# Patient Record
Sex: FEMALE | Race: WHITE | NOT HISPANIC OR LATINO | Employment: FULL TIME | URBAN - METROPOLITAN AREA
[De-identification: names, ages, dates, MRNs, and addresses within clinical notes are randomized per-mention and may not be internally consistent; named-entity substitution may affect disease eponyms.]

---

## 2021-09-02 PROCEDURE — U0004 COV-19 TEST NON-CDC HGH THRU: HCPCS | Performed by: NURSE PRACTITIONER

## 2022-02-23 ENCOUNTER — TRANSCRIBE ORDERS (OUTPATIENT)
Dept: ADMINISTRATIVE | Facility: HOSPITAL | Age: 27
End: 2022-02-23

## 2022-02-23 ENCOUNTER — LAB (OUTPATIENT)
Dept: LAB | Facility: HOSPITAL | Age: 27
End: 2022-02-23

## 2022-02-23 DIAGNOSIS — F41.9 ANXIETY: Primary | ICD-10-CM

## 2022-02-23 DIAGNOSIS — F41.9 ANXIETY: ICD-10-CM

## 2022-02-23 PROCEDURE — 36415 COLL VENOUS BLD VENIPUNCTURE: CPT

## 2022-02-23 PROCEDURE — 80050 GENERAL HEALTH PANEL: CPT

## 2022-02-24 LAB
ALBUMIN SERPL-MCNC: 4.5 G/DL (ref 3.5–5.2)
ALBUMIN/GLOB SERPL: 1.3 G/DL
ALP SERPL-CCNC: 80 U/L (ref 39–117)
ALT SERPL W P-5'-P-CCNC: 47 U/L (ref 1–33)
ANION GAP SERPL CALCULATED.3IONS-SCNC: 13.9 MMOL/L (ref 5–15)
AST SERPL-CCNC: 27 U/L (ref 1–32)
BASOPHILS # BLD AUTO: 0.04 10*3/MM3 (ref 0–0.2)
BASOPHILS NFR BLD AUTO: 0.4 % (ref 0–1.5)
BILIRUB SERPL-MCNC: 0.2 MG/DL (ref 0–1.2)
BUN SERPL-MCNC: 5 MG/DL (ref 6–20)
BUN/CREAT SERPL: 8.9 (ref 7–25)
CALCIUM SPEC-SCNC: 9.8 MG/DL (ref 8.6–10.5)
CHLORIDE SERPL-SCNC: 104 MMOL/L (ref 98–107)
CO2 SERPL-SCNC: 21.1 MMOL/L (ref 22–29)
CREAT SERPL-MCNC: 0.56 MG/DL (ref 0.57–1)
DEPRECATED RDW RBC AUTO: 43 FL (ref 37–54)
EOSINOPHIL # BLD AUTO: 0.02 10*3/MM3 (ref 0–0.4)
EOSINOPHIL NFR BLD AUTO: 0.2 % (ref 0.3–6.2)
ERYTHROCYTE [DISTWIDTH] IN BLOOD BY AUTOMATED COUNT: 15.7 % (ref 12.3–15.4)
GFR SERPL CREATININE-BSD FRML MDRD: 131 ML/MIN/1.73
GLOBULIN UR ELPH-MCNC: 3.6 GM/DL
GLUCOSE SERPL-MCNC: 87 MG/DL (ref 65–99)
HCT VFR BLD AUTO: 40.1 % (ref 34–46.6)
HGB BLD-MCNC: 12.8 G/DL (ref 12–15.9)
IMM GRANULOCYTES # BLD AUTO: 0.02 10*3/MM3 (ref 0–0.05)
IMM GRANULOCYTES NFR BLD AUTO: 0.2 % (ref 0–0.5)
LYMPHOCYTES # BLD AUTO: 1.54 10*3/MM3 (ref 0.7–3.1)
LYMPHOCYTES NFR BLD AUTO: 17.2 % (ref 19.6–45.3)
MCH RBC QN AUTO: 24.5 PG (ref 26.6–33)
MCHC RBC AUTO-ENTMCNC: 31.9 G/DL (ref 31.5–35.7)
MCV RBC AUTO: 76.8 FL (ref 79–97)
MONOCYTES # BLD AUTO: 0.59 10*3/MM3 (ref 0.1–0.9)
MONOCYTES NFR BLD AUTO: 6.6 % (ref 5–12)
NEUTROPHILS NFR BLD AUTO: 6.73 10*3/MM3 (ref 1.7–7)
NEUTROPHILS NFR BLD AUTO: 75.4 % (ref 42.7–76)
NRBC BLD AUTO-RTO: 0 /100 WBC (ref 0–0.2)
PLATELET # BLD AUTO: 276 10*3/MM3 (ref 140–450)
PMV BLD AUTO: 11.7 FL (ref 6–12)
POTASSIUM SERPL-SCNC: 4.5 MMOL/L (ref 3.5–5.2)
PROT SERPL-MCNC: 8.1 G/DL (ref 6–8.5)
RBC # BLD AUTO: 5.22 10*6/MM3 (ref 3.77–5.28)
SODIUM SERPL-SCNC: 139 MMOL/L (ref 136–145)
TSH SERPL DL<=0.05 MIU/L-ACNC: 2.74 UIU/ML (ref 0.27–4.2)
WBC NRBC COR # BLD: 8.94 10*3/MM3 (ref 3.4–10.8)

## 2024-03-26 ENCOUNTER — TRANSCRIBE ORDERS (OUTPATIENT)
Dept: LAB | Facility: HOSPITAL | Age: 29
End: 2024-03-26
Payer: COMMERCIAL

## 2024-03-26 ENCOUNTER — LAB (OUTPATIENT)
Dept: LAB | Facility: HOSPITAL | Age: 29
End: 2024-03-26
Payer: COMMERCIAL

## 2024-03-26 DIAGNOSIS — O16.1 HYPERTENSION AFFECTING PREGNANCY IN FIRST TRIMESTER: Primary | ICD-10-CM

## 2024-03-26 DIAGNOSIS — E13.9 DIABETES 1.5, MANAGED AS TYPE 2: ICD-10-CM

## 2024-03-26 DIAGNOSIS — O16.1 HYPERTENSION AFFECTING PREGNANCY IN FIRST TRIMESTER: ICD-10-CM

## 2024-03-26 LAB
ALBUMIN SERPL-MCNC: 4.1 G/DL (ref 3.5–5.2)
ALBUMIN/GLOB SERPL: 1.1 G/DL
ALP SERPL-CCNC: 131 U/L (ref 39–117)
ALT SERPL W P-5'-P-CCNC: 24 U/L (ref 1–33)
ANION GAP SERPL CALCULATED.3IONS-SCNC: 12.1 MMOL/L (ref 5–15)
AST SERPL-CCNC: 19 U/L (ref 1–32)
BASOPHILS # BLD AUTO: 0.04 10*3/MM3 (ref 0–0.2)
BASOPHILS NFR BLD AUTO: 0.6 % (ref 0–1.5)
BILIRUB SERPL-MCNC: 0.2 MG/DL (ref 0–1.2)
BUN SERPL-MCNC: 9 MG/DL (ref 6–20)
BUN/CREAT SERPL: 15.3 (ref 7–25)
CALCIUM SPEC-SCNC: 9.4 MG/DL (ref 8.6–10.5)
CHLORIDE SERPL-SCNC: 105 MMOL/L (ref 98–107)
CHOLEST SERPL-MCNC: 179 MG/DL (ref 0–200)
CO2 SERPL-SCNC: 22.9 MMOL/L (ref 22–29)
CREAT SERPL-MCNC: 0.59 MG/DL (ref 0.57–1)
DEPRECATED RDW RBC AUTO: 36.3 FL (ref 37–54)
EGFRCR SERPLBLD CKD-EPI 2021: 126.1 ML/MIN/1.73
EOSINOPHIL # BLD AUTO: 0.07 10*3/MM3 (ref 0–0.4)
EOSINOPHIL NFR BLD AUTO: 1 % (ref 0.3–6.2)
ERYTHROCYTE [DISTWIDTH] IN BLOOD BY AUTOMATED COUNT: 12.7 % (ref 12.3–15.4)
GLOBULIN UR ELPH-MCNC: 3.6 GM/DL
GLUCOSE SERPL-MCNC: 89 MG/DL (ref 65–99)
HBA1C MFR BLD: 5.1 % (ref 4.8–5.6)
HCT VFR BLD AUTO: 36.5 % (ref 34–46.6)
HDLC SERPL-MCNC: 39 MG/DL (ref 40–60)
HGB BLD-MCNC: 11.6 G/DL (ref 12–15.9)
IMM GRANULOCYTES # BLD AUTO: 0.02 10*3/MM3 (ref 0–0.05)
IMM GRANULOCYTES NFR BLD AUTO: 0.3 % (ref 0–0.5)
LDLC SERPL CALC-MCNC: 129 MG/DL (ref 0–100)
LDLC/HDLC SERPL: 3.29 {RATIO}
LYMPHOCYTES # BLD AUTO: 1.76 10*3/MM3 (ref 0.7–3.1)
LYMPHOCYTES NFR BLD AUTO: 24.3 % (ref 19.6–45.3)
MCH RBC QN AUTO: 25.5 PG (ref 26.6–33)
MCHC RBC AUTO-ENTMCNC: 31.8 G/DL (ref 31.5–35.7)
MCV RBC AUTO: 80.2 FL (ref 79–97)
MONOCYTES # BLD AUTO: 0.47 10*3/MM3 (ref 0.1–0.9)
MONOCYTES NFR BLD AUTO: 6.5 % (ref 5–12)
NEUTROPHILS NFR BLD AUTO: 4.89 10*3/MM3 (ref 1.7–7)
NEUTROPHILS NFR BLD AUTO: 67.3 % (ref 42.7–76)
NRBC BLD AUTO-RTO: 0 /100 WBC (ref 0–0.2)
PLATELET # BLD AUTO: 254 10*3/MM3 (ref 140–450)
PMV BLD AUTO: 11.1 FL (ref 6–12)
POTASSIUM SERPL-SCNC: 4.5 MMOL/L (ref 3.5–5.2)
PROT SERPL-MCNC: 7.7 G/DL (ref 6–8.5)
RBC # BLD AUTO: 4.55 10*6/MM3 (ref 3.77–5.28)
SODIUM SERPL-SCNC: 140 MMOL/L (ref 136–145)
TRIGL SERPL-MCNC: 59 MG/DL (ref 0–150)
TSH SERPL DL<=0.05 MIU/L-ACNC: 3.97 UIU/ML (ref 0.27–4.2)
VLDLC SERPL-MCNC: 11 MG/DL (ref 5–40)
WBC NRBC COR # BLD AUTO: 7.25 10*3/MM3 (ref 3.4–10.8)

## 2024-03-26 PROCEDURE — 80061 LIPID PANEL: CPT

## 2024-03-26 PROCEDURE — 80053 COMPREHEN METABOLIC PANEL: CPT

## 2024-03-26 PROCEDURE — 36415 COLL VENOUS BLD VENIPUNCTURE: CPT

## 2024-03-26 PROCEDURE — 85025 COMPLETE CBC W/AUTO DIFF WBC: CPT

## 2024-03-26 PROCEDURE — 83036 HEMOGLOBIN GLYCOSYLATED A1C: CPT

## 2024-03-26 PROCEDURE — 84443 ASSAY THYROID STIM HORMONE: CPT

## 2025-05-18 ENCOUNTER — APPOINTMENT (OUTPATIENT)
Dept: ULTRASOUND IMAGING | Facility: HOSPITAL | Age: 30
End: 2025-05-18
Payer: COMMERCIAL

## 2025-05-18 ENCOUNTER — HOSPITAL ENCOUNTER (OUTPATIENT)
Facility: HOSPITAL | Age: 30
Setting detail: OBSERVATION
Discharge: HOME | End: 2025-05-19
Attending: FAMILY MEDICINE | Admitting: EMERGENCY MEDICINE
Payer: COMMERCIAL

## 2025-05-18 DIAGNOSIS — D50.9 IRON DEFICIENCY ANEMIA, UNSPECIFIED IRON DEFICIENCY ANEMIA TYPE: ICD-10-CM

## 2025-05-18 DIAGNOSIS — R00.0 TACHYCARDIA: ICD-10-CM

## 2025-05-18 DIAGNOSIS — N93.9 ABNORMAL VAGINAL BLEEDING: Primary | ICD-10-CM

## 2025-05-18 LAB
ABO GROUP BLD: NORMAL
ALBUMIN SERPL-MCNC: 4.1 G/DL (ref 3.5–5.2)
ALBUMIN/GLOB SERPL: 1.2 G/DL
ALP SERPL-CCNC: 115 U/L (ref 39–117)
ALT SERPL W P-5'-P-CCNC: 13 U/L (ref 1–33)
ANION GAP SERPL CALCULATED.3IONS-SCNC: 11.9 MMOL/L (ref 5–15)
APTT PPP: 28 SECONDS (ref 22.7–35.4)
AST SERPL-CCNC: 20 U/L (ref 1–32)
BILIRUB SERPL-MCNC: 0.2 MG/DL (ref 0–1.2)
BLD GP AB SCN SERPL QL: NEGATIVE
BUN SERPL-MCNC: 10 MG/DL (ref 6–20)
BUN/CREAT SERPL: 13 (ref 7–25)
CALCIUM SPEC-SCNC: 9.5 MG/DL (ref 8.6–10.5)
CHLORIDE SERPL-SCNC: 105 MMOL/L (ref 98–107)
CO2 SERPL-SCNC: 22.1 MMOL/L (ref 22–29)
CREAT SERPL-MCNC: 0.77 MG/DL (ref 0.57–1)
CRP SERPL-MCNC: 3.97 MG/DL (ref 0–0.5)
D-LACTATE SERPL-SCNC: 1.3 MMOL/L (ref 0.5–2)
DEPRECATED RDW RBC AUTO: 43.1 FL (ref 37–54)
EGFRCR SERPLBLD CKD-EPI 2021: 107.2 ML/MIN/1.73
EOSINOPHIL # BLD MANUAL: 0.07 10*3/MM3 (ref 0–0.4)
EOSINOPHIL NFR BLD MANUAL: 1 % (ref 0.3–6.2)
ERYTHROCYTE [DISTWIDTH] IN BLOOD BY AUTOMATED COUNT: 17.7 % (ref 12.3–15.4)
FERRITIN SERPL-MCNC: 16.8 NG/ML (ref 13–150)
GLOBULIN UR ELPH-MCNC: 3.5 GM/DL
GLUCOSE SERPL-MCNC: 178 MG/DL (ref 65–99)
HBA1C MFR BLD: 5.53 % (ref 4.8–5.6)
HCG SERPL QL: NEGATIVE
HCT VFR BLD AUTO: 29.1 % (ref 34–46.6)
HCT VFR BLD AUTO: 30.7 % (ref 34–46.6)
HGB BLD-MCNC: 8 G/DL (ref 12–15.9)
HGB BLD-MCNC: 8.8 G/DL (ref 12–15.9)
INR PPP: 1.14 (ref 0.9–1.1)
IRON 24H UR-MRATE: 17 MCG/DL (ref 37–145)
IRON SATN MFR SERPL: 4 % (ref 20–50)
LARGE PLATELETS: NORMAL
LYMPHOCYTES # BLD MANUAL: 1.43 10*3/MM3 (ref 0.7–3.1)
LYMPHOCYTES NFR BLD MANUAL: 5 % (ref 5–12)
MCH RBC QN AUTO: 19 PG (ref 26.6–33)
MCHC RBC AUTO-ENTMCNC: 27.5 G/DL (ref 31.5–35.7)
MCV RBC AUTO: 69 FL (ref 79–97)
MICROCYTES BLD QL: NORMAL
MONOCYTES # BLD: 0.34 10*3/MM3 (ref 0.1–0.9)
NEUTROPHILS # BLD AUTO: 4.98 10*3/MM3 (ref 1.7–7)
NEUTROPHILS NFR BLD MANUAL: 73 % (ref 42.7–76)
NEUTS VAC BLD QL SMEAR: NORMAL
PLATELET # BLD AUTO: 340 10*3/MM3 (ref 140–450)
PMV BLD AUTO: 10.1 FL (ref 6–12)
POTASSIUM SERPL-SCNC: 4.1 MMOL/L (ref 3.5–5.2)
PROCALCITONIN SERPL-MCNC: 0.06 NG/ML (ref 0–0.25)
PROT SERPL-MCNC: 7.6 G/DL (ref 6–8.5)
PROTHROMBIN TIME: 14.6 SECONDS (ref 11.7–14.2)
RBC # BLD AUTO: 4.22 10*6/MM3 (ref 3.77–5.28)
RBC MORPH BLD: NORMAL
RH BLD: POSITIVE
SCAN SLIDE: NORMAL
SODIUM SERPL-SCNC: 139 MMOL/L (ref 136–145)
T&S EXPIRATION DATE: NORMAL
TIBC SERPL-MCNC: 447 MCG/DL (ref 298–536)
TOXIC GRANULATION: NORMAL
TRANSFERRIN SERPL-MCNC: 300 MG/DL (ref 200–360)
TSH SERPL DL<=0.05 MIU/L-ACNC: 2.48 UIU/ML (ref 0.27–4.2)
VARIANT LYMPHS NFR BLD MANUAL: 21 % (ref 19.6–45.3)
WBC NRBC COR # BLD AUTO: 6.82 10*3/MM3 (ref 3.4–10.8)

## 2025-05-18 PROCEDURE — 86901 BLOOD TYPING SEROLOGIC RH(D): CPT

## 2025-05-18 PROCEDURE — 36430 TRANSFUSION BLD/BLD COMPNT: CPT

## 2025-05-18 PROCEDURE — P9016 RBC LEUKOCYTES REDUCED: HCPCS

## 2025-05-18 PROCEDURE — 25810000003 SODIUM CHLORIDE 0.9 % SOLUTION

## 2025-05-18 PROCEDURE — 82728 ASSAY OF FERRITIN: CPT | Performed by: NURSE PRACTITIONER

## 2025-05-18 PROCEDURE — G0378 HOSPITAL OBSERVATION PER HR: HCPCS

## 2025-05-18 PROCEDURE — 76856 US EXAM PELVIC COMPLETE: CPT

## 2025-05-18 PROCEDURE — 84443 ASSAY THYROID STIM HORMONE: CPT

## 2025-05-18 PROCEDURE — 84703 CHORIONIC GONADOTROPIN ASSAY: CPT | Performed by: NURSE PRACTITIONER

## 2025-05-18 PROCEDURE — 96361 HYDRATE IV INFUSION ADD-ON: CPT

## 2025-05-18 PROCEDURE — 84145 PROCALCITONIN (PCT): CPT

## 2025-05-18 PROCEDURE — 83036 HEMOGLOBIN GLYCOSYLATED A1C: CPT

## 2025-05-18 PROCEDURE — 83540 ASSAY OF IRON: CPT | Performed by: NURSE PRACTITIONER

## 2025-05-18 PROCEDURE — 99285 EMERGENCY DEPT VISIT HI MDM: CPT

## 2025-05-18 PROCEDURE — 93976 VASCULAR STUDY: CPT

## 2025-05-18 PROCEDURE — 96365 THER/PROPH/DIAG IV INF INIT: CPT

## 2025-05-18 PROCEDURE — 84466 ASSAY OF TRANSFERRIN: CPT | Performed by: NURSE PRACTITIONER

## 2025-05-18 PROCEDURE — 25810000003 SODIUM CHLORIDE 0.9 % SOLUTION: Performed by: NURSE PRACTITIONER

## 2025-05-18 PROCEDURE — 80053 COMPREHEN METABOLIC PANEL: CPT | Performed by: NURSE PRACTITIONER

## 2025-05-18 PROCEDURE — 86900 BLOOD TYPING SEROLOGIC ABO: CPT

## 2025-05-18 PROCEDURE — 93005 ELECTROCARDIOGRAM TRACING: CPT | Performed by: FAMILY MEDICINE

## 2025-05-18 PROCEDURE — 86900 BLOOD TYPING SEROLOGIC ABO: CPT | Performed by: NURSE PRACTITIONER

## 2025-05-18 PROCEDURE — 85730 THROMBOPLASTIN TIME PARTIAL: CPT | Performed by: NURSE PRACTITIONER

## 2025-05-18 PROCEDURE — 86923 COMPATIBILITY TEST ELECTRIC: CPT

## 2025-05-18 PROCEDURE — 85014 HEMATOCRIT: CPT

## 2025-05-18 PROCEDURE — 85007 BL SMEAR W/DIFF WBC COUNT: CPT | Performed by: NURSE PRACTITIONER

## 2025-05-18 PROCEDURE — 85025 COMPLETE CBC W/AUTO DIFF WBC: CPT | Performed by: NURSE PRACTITIONER

## 2025-05-18 PROCEDURE — 85018 HEMOGLOBIN: CPT

## 2025-05-18 PROCEDURE — 93005 ELECTROCARDIOGRAM TRACING: CPT

## 2025-05-18 PROCEDURE — 25010000002 NA FERRIC GLUC CPLX PER 12.5 MG: Performed by: NURSE PRACTITIONER

## 2025-05-18 PROCEDURE — 86901 BLOOD TYPING SEROLOGIC RH(D): CPT | Performed by: NURSE PRACTITIONER

## 2025-05-18 PROCEDURE — 83605 ASSAY OF LACTIC ACID: CPT

## 2025-05-18 PROCEDURE — 84146 ASSAY OF PROLACTIN: CPT

## 2025-05-18 PROCEDURE — 85610 PROTHROMBIN TIME: CPT | Performed by: NURSE PRACTITIONER

## 2025-05-18 PROCEDURE — 86140 C-REACTIVE PROTEIN: CPT

## 2025-05-18 PROCEDURE — 86850 RBC ANTIBODY SCREEN: CPT | Performed by: NURSE PRACTITIONER

## 2025-05-18 PROCEDURE — 76830 TRANSVAGINAL US NON-OB: CPT

## 2025-05-18 RX ORDER — ONDANSETRON 4 MG/1
4 TABLET, ORALLY DISINTEGRATING ORAL EVERY 6 HOURS PRN
Status: DISCONTINUED | OUTPATIENT
Start: 2025-05-18 | End: 2025-05-19 | Stop reason: HOSPADM

## 2025-05-18 RX ORDER — AMOXICILLIN 250 MG
2 CAPSULE ORAL 2 TIMES DAILY PRN
Status: DISCONTINUED | OUTPATIENT
Start: 2025-05-18 | End: 2025-05-19 | Stop reason: HOSPADM

## 2025-05-18 RX ORDER — ACETAMINOPHEN 160 MG/5ML
650 SOLUTION ORAL EVERY 4 HOURS PRN
Status: DISCONTINUED | OUTPATIENT
Start: 2025-05-18 | End: 2025-05-19 | Stop reason: HOSPADM

## 2025-05-18 RX ORDER — BISACODYL 5 MG/1
5 TABLET, DELAYED RELEASE ORAL DAILY PRN
Status: DISCONTINUED | OUTPATIENT
Start: 2025-05-18 | End: 2025-05-19 | Stop reason: HOSPADM

## 2025-05-18 RX ORDER — TRANEXAMIC ACID 10 MG/ML
1000 INJECTION, SOLUTION INTRAVENOUS ONCE
Status: COMPLETED | OUTPATIENT
Start: 2025-05-18 | End: 2025-05-18

## 2025-05-18 RX ORDER — ACETAMINOPHEN 325 MG/1
650 TABLET ORAL EVERY 4 HOURS PRN
Status: DISCONTINUED | OUTPATIENT
Start: 2025-05-18 | End: 2025-05-19 | Stop reason: HOSPADM

## 2025-05-18 RX ORDER — HYDROCODONE BITARTRATE AND ACETAMINOPHEN 5; 325 MG/1; MG/1
1 TABLET ORAL EVERY 6 HOURS PRN
Refills: 0 | Status: DISCONTINUED | OUTPATIENT
Start: 2025-05-18 | End: 2025-05-19 | Stop reason: HOSPADM

## 2025-05-18 RX ORDER — SODIUM CHLORIDE 9 MG/ML
40 INJECTION, SOLUTION INTRAVENOUS AS NEEDED
Status: DISCONTINUED | OUTPATIENT
Start: 2025-05-18 | End: 2025-05-19 | Stop reason: HOSPADM

## 2025-05-18 RX ORDER — BISACODYL 10 MG
10 SUPPOSITORY, RECTAL RECTAL DAILY PRN
Status: DISCONTINUED | OUTPATIENT
Start: 2025-05-18 | End: 2025-05-19 | Stop reason: HOSPADM

## 2025-05-18 RX ORDER — POLYETHYLENE GLYCOL 3350 17 G/17G
17 POWDER, FOR SOLUTION ORAL DAILY PRN
Status: DISCONTINUED | OUTPATIENT
Start: 2025-05-18 | End: 2025-05-19 | Stop reason: HOSPADM

## 2025-05-18 RX ORDER — SODIUM CHLORIDE 9 MG/ML
100 INJECTION, SOLUTION INTRAVENOUS CONTINUOUS
Status: DISCONTINUED | OUTPATIENT
Start: 2025-05-18 | End: 2025-05-19 | Stop reason: HOSPADM

## 2025-05-18 RX ORDER — SODIUM CHLORIDE 0.9 % (FLUSH) 0.9 %
10 SYRINGE (ML) INJECTION AS NEEDED
Status: DISCONTINUED | OUTPATIENT
Start: 2025-05-18 | End: 2025-05-19 | Stop reason: HOSPADM

## 2025-05-18 RX ORDER — ONDANSETRON 2 MG/ML
4 INJECTION INTRAMUSCULAR; INTRAVENOUS EVERY 6 HOURS PRN
Status: DISCONTINUED | OUTPATIENT
Start: 2025-05-18 | End: 2025-05-19 | Stop reason: HOSPADM

## 2025-05-18 RX ORDER — MEDROXYPROGESTERONE ACETATE 10 MG
10 TABLET ORAL EVERY 8 HOURS SCHEDULED
Status: DISCONTINUED | OUTPATIENT
Start: 2025-05-18 | End: 2025-05-19 | Stop reason: HOSPADM

## 2025-05-18 RX ORDER — SODIUM CHLORIDE 0.9 % (FLUSH) 0.9 %
10 SYRINGE (ML) INJECTION EVERY 12 HOURS SCHEDULED
Status: DISCONTINUED | OUTPATIENT
Start: 2025-05-18 | End: 2025-05-19 | Stop reason: HOSPADM

## 2025-05-18 RX ORDER — ACETAMINOPHEN 650 MG/1
650 SUPPOSITORY RECTAL EVERY 4 HOURS PRN
Status: DISCONTINUED | OUTPATIENT
Start: 2025-05-18 | End: 2025-05-19 | Stop reason: HOSPADM

## 2025-05-18 RX ADMIN — Medication 10 ML: at 20:48

## 2025-05-18 RX ADMIN — MEDROXYPROGESTERONE ACETATE 10 MG: 10 TABLET ORAL at 22:10

## 2025-05-18 RX ADMIN — TRANEXAMIC ACID 1000 MG: 10 INJECTION, SOLUTION INTRAVENOUS at 16:11

## 2025-05-18 RX ADMIN — SODIUM CHLORIDE 100 ML/HR: 9 INJECTION, SOLUTION INTRAVENOUS at 20:24

## 2025-05-18 RX ADMIN — SODIUM CHLORIDE 250 MG: 9 INJECTION, SOLUTION INTRAVENOUS at 17:51

## 2025-05-18 RX ADMIN — SODIUM CHLORIDE 500 ML: 9 INJECTION, SOLUTION INTRAVENOUS at 17:32

## 2025-05-18 NOTE — ED PROVIDER NOTES
Subjective   History of Present Illness  Patient is a 29-year-old obese female who states she has a history of PCOS who comes in with heavy vaginal bleeding she states she has been bleeding for about a month but it has been worse over the last 24 to 48 hours she states that about 5 years ago she went to [facility] and was put on birth control which she took for about a year and then she did not take it for the next 5 years and then recently she has been placed back on birth control by [facility] but is never seen OB/GYN      Review of Systems   Genitourinary:  Positive for vaginal bleeding.       History reviewed. No pertinent past medical history.    No Known Allergies    Past Surgical History:   Procedure Laterality Date    ANKLE FUSION         Family History   Problem Relation Age of Onset    Hypertension Mother     Hypertension Father     Diabetes Father        Social History     Socioeconomic History    Marital status:    Tobacco Use    Smoking status: Never    Smokeless tobacco: Never   Vaping Use    Vaping status: Never Used   Substance and Sexual Activity    Alcohol use: No    Drug use: Defer    Sexual activity: Defer           Objective   Physical Exam  Vitals reviewed.   Constitutional:       Appearance: She is obese.   HENT:      Head: Normocephalic and atraumatic.      Mouth/Throat:      Mouth: Mucous membranes are moist.   Cardiovascular:      Rate and Rhythm: Regular rhythm. Tachycardia present.      Pulses: Normal pulses.   Pulmonary:      Effort: Pulmonary effort is normal.      Breath sounds: Normal breath sounds.   Abdominal:      General: Bowel sounds are normal.      Palpations: Abdomen is soft.      Tenderness: There is no abdominal tenderness.   Musculoskeletal:      Cervical back: Neck supple.   Skin:     Capillary Refill: Capillary refill takes less than 2 seconds.   Neurological:      General: No focal deficit present.   Psychiatric:         Mood and Affect: Mood  "normal.         Behavior: Behavior normal.         Procedures           ED Course  ED Course as of 05/18/25 2320   Sun May 18, 2025   1700 Spoke with Dr. Donnelly who agreed to see the patient in the morning and will review the chart after the ultrasound transvaginal is done but wishes the patient to be admitted to the hospitalist [KW]   1725 Spoke with Minor MONTERO with the hospitalist who agreed to admit.  [KW]      ED Course User Index  [KW] Jerilyn Melendrez, APRN        /83 (BP Location: Left arm, Patient Position: Lying)   Pulse 78   Temp 98.1 °F (36.7 °C) (Oral)   Resp 23   Ht 167.6 cm (66\")   Wt (!) 145 kg (319 lb 14.2 oz)   LMP 05/18/2025 (Approximate)   SpO2 100%   BMI 51.63 kg/m²   Labs Reviewed   COMPREHENSIVE METABOLIC PANEL - Abnormal; Notable for the following components:       Result Value    Glucose 178 (*)     All other components within normal limits    Narrative:     GFR Categories in Chronic Kidney Disease (CKD)              GFR Category          GFR (mL/min/1.73)    Interpretation  G1                    90 or greater        Normal or high (1)  G2                    60-89                Mild decrease (1)  G3a                   45-59                Mild to moderate decrease  G3b                   30-44                Moderate to severe decrease  G4                    15-29                Severe decrease  G5                    14 or less           Kidney failure    (1)In the absence of evidence of kidney disease, neither GFR category G1 or G2 fulfill the criteria for CKD.    eGFR calculation 2021 CKD-EPI creatinine equation, which does not include race as a factor   PROTIME-INR - Abnormal; Notable for the following components:    Protime 14.6 (*)     INR 1.14 (*)     All other components within normal limits   CBC WITH AUTO DIFFERENTIAL - Abnormal; Notable for the following components:    Hemoglobin 8.0 (*)     Hematocrit 29.1 (*)     MCV 69.0 (*)     MCH 19.0 (*)     MCHC 27.5 (*)     " "RDW 17.7 (*)     All other components within normal limits   IRON PROFILE - Abnormal; Notable for the following components:    Iron 17 (*)     Iron Saturation (TSAT) 4 (*)     All other components within normal limits   HEMOGLOBIN AND HEMATOCRIT, BLOOD - Abnormal; Notable for the following components:    Hemoglobin 8.8 (*)     Hematocrit 30.7 (*)     All other components within normal limits   C-REACTIVE PROTEIN - Abnormal; Notable for the following components:    C-Reactive Protein 3.97 (*)     All other components within normal limits   APTT - Normal   HCG, SERUM, QUALITATIVE - Normal   FERRITIN - Normal    Narrative:     Results may be falsely decreased if patient taking Biotin.     PROCALCITONIN - Normal    Narrative:     As a Marker for Sepsis (Non-Neonates):    1. <0.5 ng/mL represents a low risk of severe sepsis and/or septic shock.  2. >2 ng/mL represents a high risk of severe sepsis and/or septic shock.    As a Marker for Lower Respiratory Tract Infections that require antibiotic therapy:    PCT on Admission    Antibiotic Therapy       6-12 Hrs later    >0.5                Strongly Recommended  >0.25 - <0.5        Recommended   0.1 - 0.25          Discouraged              Remeasure/reassess PCT  <0.1                Strongly Discouraged     Remeasure/reassess PCT    As 28 day mortality risk marker: \"Change in Procalcitonin Result\" (>80% or <=80%) if Day 0 (or Day 1) and Day 4 values are available. Refer to http://www.[employer]-pct-calculator.com    Change in PCT <=80%  A decrease of PCT levels below or equal to 80% defines a positive change in PCT test result representing a higher risk for 28-day all-cause mortality of patients diagnosed with severe sepsis for septic shock.    Change in PCT >80%  A decrease of PCT levels of more than 80% defines a negative change in PCT result representing a lower risk for 28-day all-cause mortality of patients diagnosed with severe sepsis or septic shock.      LACTIC ACID, " PLASMA - Normal   HEMOGLOBIN A1C - Normal    Narrative:     Hemoglobin A1C Ranges:    Increased Risk for Diabetes  5.7% to 6.4%  Diabetes                     >= 6.5%  Diabetic Goal                < 7.0%   TSH RFX ON ABNORMAL TO FREE T4 - Normal   SCAN SLIDE   MANUAL DIFFERENTIAL   HEMOGLOBIN AND HEMATOCRIT, BLOOD   PROLACTIN   BASIC METABOLIC PANEL   MAGNESIUM   PHOSPHORUS   CBC WITH AUTO DIFFERENTIAL   URINALYSIS W/ CULTURE IF INDICATED   TYPE AND SCREEN   PREPARE RBC   CBC AND DIFFERENTIAL    Narrative:     The following orders were created for panel order CBC & Differential.  Procedure                               Abnormality         Status                     ---------                               -----------         ------                     CBC Auto Differential[[id number]]        Abnormal            Final result               Scan Slide[[id number]]                                       Final result                 Please view results for these tests on the individual orders.   CBC AND DIFFERENTIAL    Narrative:     The following orders were created for panel order CBC & Differential.  Procedure                               Abnormality         Status                     ---------                               -----------         ------                     CBC Auto Differential[[id number]]                                                         Please view results for these tests on the individual orders.     Medications   sodium chloride 0.9 % flush 10 mL (has no administration in time range)   ferric gluconate (FERRLECIT)125 MG in sodium chloride 0.9 % 100 mL IVPB (has no administration in time range)   sodium chloride 0.9 % flush 10 mL (10 mL Intravenous Given 5/18/25 2048)   sodium chloride 0.9 % flush 10 mL (has no administration in time range)   sodium chloride 0.9 % infusion 40 mL (has no administration in time range)   Potassium Replacement - Follow Nurse / BPA Driven Protocol (has no administration in  time range)   Magnesium Standard Dose Replacement - Follow Nurse / BPA Driven Protocol (has no administration in time range)   Phosphorus Replacement - Follow Nurse / BPA Driven Protocol (has no administration in time range)   Calcium Replacement - Follow Nurse / BPA Driven Protocol (has no administration in time range)   acetaminophen (TYLENOL) tablet 650 mg (has no administration in time range)     Or   acetaminophen (TYLENOL) 160 MG/5ML oral solution 650 mg (has no administration in time range)     Or   acetaminophen (TYLENOL) suppository 650 mg (has no administration in time range)   HYDROcodone-acetaminophen (NORCO) 5-325 MG per tablet 1 tablet (has no administration in time range)   sennosides-docusate (PERICOLACE) 8.6-50 MG per tablet 2 tablet (has no administration in time range)     And   polyethylene glycol (MIRALAX) packet 17 g (has no administration in time range)     And   bisacodyl (DULCOLAX) EC tablet 5 mg (has no administration in time range)     And   bisacodyl (DULCOLAX) suppository 10 mg (has no administration in time range)   ondansetron ODT (ZOFRAN-ODT) disintegrating tablet 4 mg (has no administration in time range)     Or   ondansetron (ZOFRAN) injection 4 mg (has no administration in time range)   sodium chloride 0.9 % infusion (100 mL/hr Intravenous New Bag 5/18/25 2024)   medroxyPROGESTERone (PROVERA) tablet 10 mg (10 mg Oral Given 5/18/25 2210)   tranexamic acid 1000 mg in 100 mL 0.7% NaCl infusion (premix) (0 mg Intravenous Stopped 5/18/25 1645)   ferric gluconate (FERRLECIT) 250 MG in sodium chloride 0.9% 250 mL IVPB (250 mg Intravenous New Bag 5/18/25 1751)   sodium chloride 0.9 % bolus 500 mL (0 mL Intravenous Stopped 5/18/25 1906)     US Pelvis Transvaginal Non OB  Result Date: 5/18/2025  Impression: Pelvic ultrasound appears within normal limits. Electronically Signed: Bentley Buckley MD  5/18/2025 9:20 PM EDT  Workstation ID: [id number]                                                    Medical Decision Making  Patient is a 29-year-old obese female who comes in with abnormal vaginal bleeding that she states has been going on for a month but has gotten worse over the last 24 to 48 hours she states that she has been saturating about 1 or 2 pads an hour and has had clots as large as quarters.  She states that she has never been seen by OB/GYN she has only been seen at [facility] she comes in today had IV established and blood work was obtained and reviewed and the patient was found to have heart rate in the 140s and was found to have a hemoglobin of 8 and hematocrit of 29.1 was also found to be iron deficient with an iron of 17 and an iron saturation of 4-iron replacement was ordered the patient was ordered 1 g of TXA and 1 unit of packed red blood cells the patient was discussed with Dr. Carlson who was agreeable to this plan of care.  The patient's heart rate continued to improve was down into the 120s on admission and the patient was discussed with Dr. Donnelly who agreed that the patient should be seen by her in the morning but will be admitted to the hospitalist tonight patient was agreeable to this plan of care as well as her  at bedside    Problems Addressed:  Abnormal vaginal bleeding: complicated acute illness or injury  Iron deficiency anemia, unspecified iron deficiency anemia type: complicated acute illness or injury  Tachycardia: complicated acute illness or injury    Amount and/or Complexity of Data Reviewed  Independent Historian: spouse     Details:  at bedside  Labs: ordered. Decision-making details documented in ED Course.  ECG/medicine tests: ordered and independent interpretation performed. Decision-making details documented in ED Course.    Risk  OTC drugs.  Prescription drug management.  Decision regarding hospitalization.        Final diagnoses:   Abnormal vaginal bleeding   Iron deficiency anemia, unspecified iron deficiency anemia type   Tachycardia        ED Disposition  ED Disposition       ED Disposition   Decision to Admit    Condition   --    Comment   Level of Care: Telemetry [5]   Diagnosis: Vaginal bleeding, abnormal [162545]   Admitting Physician: SREEDHAR FORREST [[id number]]   Attending Physician: SREEDHAR FORREST [[id number]]   Certification: I Certify That Inpatient Hospital Services Are Medically Necessary For Greater Than 2 Midnights                 No follow-up provider specified.       Medication List        Stop      doxycycline 100 MG capsule  Commonly known as: Jerilyn Cardenas, APRN  05/18/25 3857

## 2025-05-18 NOTE — ED NOTES
Pt came in for vaginal bleeding [id number]. Pt states she is usually irregular and bleed for a month sometimes but not this heavy bleeding. Pt is currently in birth control pill and stopped it a week ago. Pt alert and oriented. Call light within reach

## 2025-05-18 NOTE — Clinical Note
Level of Care: Telemetry [5]   Admitting Physician: SREEDHAR FORREST [[id number]]   Attending Physician: SREEDHAR FORREST [[id number]]

## 2025-05-18 NOTE — H&P
[facility] Medicine Services  History & Physical    Patient Name: Ana Infante  : 1995  MRN: [id number]  Primary Care Physician:  Gerald Livingston MD  Date of admission: 2025  Date and Time of Service: 2025 at 1730    Subjective      Chief Complaint: heavy vaginal bleeding    History of Present Illness: Ana Infante is a 29 y.o. female with a CMH of morbid obesity and PCOS who presented to [facility] on 2025 with heavy abnormal bleeding x1 month. Pt states she has been bleeding for about a month but it has been worse over the last 24 to 48 hours, she soacked through 10 pads so far today. She states that about 5 years ago she went to [facility] and was put on birth control which she took for about a year and then she did not take it for the next 5 years and then recently she has been placed back on birth control (combo pill) by [facility] but is never seen OB/GYN. She took it for 3 weeks up until the placebo week. She reports the birth control made her feel terrible endorsing cramping and GI upset. Pt also endorses some dysuria which started last night. She denies any abnormal vaginal discharge, lesions, or itching. She states she purchased azo-standard last night but never took any. She denies any urinary frequency, chills, fevers, N/V/D, cp, soa, cough, or sick contacts. Endorses some palpitations.      Review of Systems 10 point review of systems neg except for above     Personal History     History reviewed. No pertinent past medical history.    Past Surgical History:   Procedure Laterality Date    ANKLE FUSION         Family History: family history includes Diabetes in her father; Hypertension in her father and mother. Otherwise pertinent FHx was reviewed and not pertinent to current issue.    Social History:  reports that she has never smoked. She has never used smokeless tobacco. Drug use questions deferred to the physician. She reports  that she does not drink alcohol.    Home Medications:  Prior to Admission Medications       Prescriptions Last Dose Informant Patient Reported? Taking?    doxycycline (MONODOX) 100 MG capsule   No No    Take 1 capsule by mouth 2 (Two) Times a Day.              Allergies:  No Known Allergies    Objective      Vitals:   Temp:  [98.2 °F (36.8 °C)-99.2 °F (37.3 °C)] 99.2 °F (37.3 °C)  Heart Rate:  [116-147] 124  Resp:  [15-18] 16  BP: (119-144)/(79-94) 135/90  Body mass index is 51.63 kg/m².  Physical Exam  Constitutional:       Appearance: Normal appearance. She is obese.   Eyes:      Extraocular Movements: Extraocular movements intact.      Pupils: Pupils are equal, round, and reactive to light.   Cardiovascular:      Rate and Rhythm: Regular rhythm. Tachycardia present.      Pulses: Normal pulses.      Heart sounds: Normal heart sounds.   Pulmonary:      Effort: Pulmonary effort is normal.      Breath sounds: Normal breath sounds.   Abdominal:      General: Abdomen is protuberant. Bowel sounds are normal.      Palpations: Abdomen is soft.      Tenderness: There is no right CVA tenderness or left CVA tenderness.   Musculoskeletal:         General: Normal range of motion.   Skin:     General: Skin is warm and dry.      Capillary Refill: Capillary refill takes 2 to 3 seconds.   Neurological:      General: No focal deficit present.      Mental Status: She is alert and oriented to person, place, and time. Mental status is at baseline.   Psychiatric:         Mood and Affect: Mood normal.         Behavior: Behavior normal.         Diagnostic Data:  Lab Results (last 24 hours)       Procedure Component Value Units Date/Time    TSH Rfx On Abnormal To Free T4 [[id number]]  (Normal) Collected: 05/18/25 1513    Specimen: Blood Updated: 05/18/25 1818     TSH 2.480 uIU/mL     Hemoglobin A1c [[id number]]  (Normal) Collected: 05/18/25 1513    Specimen: Blood Updated: 05/18/25 1758     Hemoglobin A1C 5.53 %     Narrative:       "Hemoglobin A1C Ranges:    Increased Risk for Diabetes  5.7% to 6.4%  Diabetes                     >= 6.5%  Diabetic Goal                < 7.0%    C-reactive Protein [[id number]]  (Abnormal) Collected: 05/18/25 1513    Specimen: Blood Updated: 05/18/25 1758     C-Reactive Protein 3.97 mg/dL     Procalcitonin [[id number]]  (Normal) Collected: 05/18/25 1513    Specimen: Blood Updated: 05/18/25 1758     Procalcitonin 0.06 ng/mL     Narrative:      As a Marker for Sepsis (Non-Neonates):    1. <0.5 ng/mL represents a low risk of severe sepsis and/or septic shock.  2. >2 ng/mL represents a high risk of severe sepsis and/or septic shock.    As a Marker for Lower Respiratory Tract Infections that require antibiotic therapy:    PCT on Admission    Antibiotic Therapy       6-12 Hrs later    >0.5                Strongly Recommended  >0.25 - <0.5        Recommended   0.1 - 0.25          Discouraged              Remeasure/reassess PCT  <0.1                Strongly Discouraged     Remeasure/reassess PCT    As 28 day mortality risk marker: \"Change in Procalcitonin Result\" (>80% or <=80%) if Day 0 (or Day 1) and Day 4 values are available. Refer to http://[email or web address]-pct-calculator.com    Change in PCT <=80%  A decrease of PCT levels below or equal to 80% defines a positive change in PCT test result representing a higher risk for 28-day all-cause mortality of patients diagnosed with severe sepsis for septic shock.    Change in PCT >80%  A decrease of PCT levels of more than 80% defines a negative change in PCT result representing a lower risk for 28-day all-cause mortality of patients diagnosed with severe sepsis or septic shock.       Ferritin [[id number]]  (Normal) Collected: 05/18/25 1513    Specimen: Blood Updated: 05/18/25 1643     Ferritin 16.80 ng/mL     Narrative:      Results may be falsely decreased if patient taking Biotin.      Iron Profile [[id number]]  (Abnormal) Collected: 05/18/25 1513    Specimen: Blood Updated: 05/18/25 " 1611     Iron 17 mcg/dL      Iron Saturation (TSAT) 4 %      Transferrin 300 mg/dL      TIBC 447 mcg/dL     Manual Differential [[id number]] Collected: 05/18/25 1513    Specimen: Blood Updated: 05/18/25 1541     Neutrophil % 73.0 %      Lymphocyte % 21.0 %      Monocyte % 5.0 %      Eosinophil % 1.0 %      Neutrophils Absolute 4.98 10*3/mm3      Lymphocytes Absolute 1.43 10*3/mm3      Monocytes Absolute 0.34 10*3/mm3      Eosinophils Absolute 0.07 10*3/mm3      RBC Morphology --     Comment: Corrected result. Previous result was Normal on 5/18/2025 at 1537 EDT.        Microcytes Slight/1+     Comment: Appended report. These results have been appended to a previously final verified report.        Toxic Granulation Slight/1+     Vacuolated Neutrophils Slight/1+     Large Platelets Mod/2+    Comprehensive Metabolic Panel [[id number]]  (Abnormal) Collected: 05/18/25 1513    Specimen: Blood Updated: 05/18/25 1539     Glucose 178 mg/dL      BUN 10 mg/dL      Creatinine 0.77 mg/dL      Sodium 139 mmol/L      Potassium 4.1 mmol/L      Chloride 105 mmol/L      CO2 22.1 mmol/L      Calcium 9.5 mg/dL      Total Protein 7.6 g/dL      Albumin 4.1 g/dL      ALT (SGPT) 13 U/L      AST (SGOT) 20 U/L      Alkaline Phosphatase 115 U/L      Total Bilirubin 0.2 mg/dL      Globulin 3.5 gm/dL      A/G Ratio 1.2 g/dL      BUN/Creatinine Ratio 13.0     Anion Gap 11.9 mmol/L      eGFR 107.2 mL/min/1.73     Narrative:      GFR Categories in Chronic Kidney Disease (CKD)              GFR Category          GFR (mL/min/1.73)    Interpretation  G1                    90 or greater        Normal or high (1)  G2                    60-89                Mild decrease (1)  G3a                   45-59                Mild to moderate decrease  G3b                   30-44                Moderate to severe decrease  G4                    15-29                Severe decrease  G5                    14 or less           Kidney failure    (1)In the absence of  evidence of kidney disease, neither GFR category G1 or G2 fulfill the criteria for CKD.    eGFR calculation 2021 CKD-EPI creatinine equation, which does not include race as a factor    CBC & Differential [[id number]]  (Abnormal) Collected: 05/18/25 1513    Specimen: Blood Updated: 05/18/25 1537    Narrative:      The following orders were created for panel order CBC & Differential.  Procedure                               Abnormality         Status                     ---------                               -----------         ------                     CBC Auto Differential[[id number]]        Abnormal            Final result               Scan Slide[[id number]]                                       Final result                 Please view results for these tests on the individual orders.    CBC Auto Differential [[id number]]  (Abnormal) Collected: 05/18/25 1513    Specimen: Blood Updated: 05/18/25 1537     WBC 6.82 10*3/mm3      RBC 4.22 10*6/mm3      Hemoglobin 8.0 g/dL      Hematocrit 29.1 %      MCV 69.0 fL      MCH 19.0 pg      MCHC 27.5 g/dL      RDW 17.7 %      RDW-SD 43.1 fl      MPV 10.1 fL      Platelets 340 10*3/mm3     Scan Slide [[id number]] Collected: 05/18/25 1513    Specimen: Blood Updated: 05/18/25 1537     Scan Slide --     Comment: See Manual Differential Results       hCG, Serum, Qualitative [[id number]]  (Normal) Collected: 05/18/25 1513    Specimen: Blood Updated: 05/18/25 1533     HCG Qualitative Negative    Protime-INR [[id number]]  (Abnormal) Collected: 05/18/25 1513    Specimen: Blood Updated: 05/18/25 1531     Protime 14.6 Seconds      INR 1.14    aPTT [[id number]]  (Normal) Collected: 05/18/25 1513    Specimen: Blood Updated: 05/18/25 1531     PTT 28.0 seconds              Imaging Results (Last 24 Hours)       ** No results found for the last 24 hours. **              Assessment & Plan        This is a 29 y.o. female with:    Active and Resolved Problems  Active Hospital Problems    Diagnosis   POA    **Vaginal bleeding, abnormal [N93.9]  Yes      Resolved Hospital Problems   No resolved problems to display.       Impression:  Menorrhagia  tachycardia  dysuria  PCOS  Acute Blood loss anemia  Iron deficiency anemia  morbid obesity    Plan: Admit to [facility]. ED records, labs, and imaging reviewed.     Menorrhagia  tachycardia  dysuria  PCOS  Acute Blood loss anemia  Iron deficiency anemia  - HCG neg  - Hgb 8 OA, 1 unit PRBC ordered by ER provider. Type and screen collected  - Will trend H&H q8h  - EKG interpreted by me: Sinus tachycardia with a rate of 142, no acute ischemic changes, QTc 438.  No previous for comparison  - HD stable now, HR improved to 117 during my interview. Satting 99% on RA, easy work of breathing temp 99.2 suspect tachycardia 2/2 blood loss and anxiety  -OBGYN consulted and will evaluate pt tomorrow unless she decompensates, low threshed to contact GYN overnight  -TVUS ordered, GYN will follow results and order progesterone as appropriate  -pt s/p 1000mg IV TXA  -Iron 17, ferritin 16.80, TSAT 4, transferrin 300, TIBC 447  - 250mg IV ferric gluconate ordered in ER, continue 125mg x4 days  -TSH with reflex free T4 and prolactin pending  -WBC 6.82, CRP slightly elevated at 3.97, CBC with 1+ toxic granulation, procal 0.06, lactic pending. UA pending. Initiate abx as appropriate. Obtain CT AP if pt endorses CVA tenderness or develops systemic symptoms  -start on PO iron at discharge  -pt will need to establish with OBGYN outpatient for close f/u    morbid obesity: impacts all aspects of care  -A1c pending    VTE Prophylaxis:  Mechanical VTE prophylaxis orders are present.        The patient desires to be as follows:    CODE STATUS:    Code Status (Patient has no pulse and is not breathing): CPR (Attempt to Resuscitate)  Medical Interventions (Patient has pulse or is breathing): Full Support        Kris, spouse, who can be contacted at [phone], is the designated person to make medical  decisions on the patient's behalf if She is incapable of doing so. This was clarified with patient and/or next of kin on 5/18/2025 during the course of this H&P.    Admission Status:  I believe this patient meets inpatient status.    Expected Length of Stay: 3 days    PDMP and Medication Dispenses via Sidebar reviewed and consistent with patient reported medications.    I discussed the patient's findings and my recommendations with patient.      Signature:     This document has been electronically signed by Chantal Ariza PA-C on May 18, 2025 18:40 EDT   [facility] Hospitalist Team

## 2025-05-19 VITALS
HEART RATE: 107 BPM | RESPIRATION RATE: 18 BRPM | WEIGHT: 293 LBS | SYSTOLIC BLOOD PRESSURE: 143 MMHG | BODY MASS INDEX: 47.09 KG/M2 | TEMPERATURE: 97.8 F | OXYGEN SATURATION: 99 % | HEIGHT: 66 IN | DIASTOLIC BLOOD PRESSURE: 79 MMHG

## 2025-05-19 PROBLEM — N93.9 VAGINAL BLEEDING: Status: ACTIVE | Noted: 2025-05-19

## 2025-05-19 PROBLEM — N93.9 VAGINAL BLEEDING, ABNORMAL: Status: RESOLVED | Noted: 2025-05-18 | Resolved: 2025-05-19

## 2025-05-19 LAB
ANION GAP SERPL CALCULATED.3IONS-SCNC: 9.7 MMOL/L (ref 5–15)
BACTERIA UR QL AUTO: ABNORMAL /HPF
BASOPHILS # BLD AUTO: 0.04 10*3/MM3 (ref 0–0.2)
BASOPHILS NFR BLD AUTO: 0.4 % (ref 0–1.5)
BH BB BLOOD EXPIRATION DATE: NORMAL
BH BB BLOOD TYPE BARCODE: 5100
BH BB DISPENSE STATUS: NORMAL
BH BB PRODUCT CODE: NORMAL
BH BB UNIT NUMBER: NORMAL
BILIRUB UR QL STRIP: NEGATIVE
BUN SERPL-MCNC: 8 MG/DL (ref 6–20)
BUN/CREAT SERPL: 13.1 (ref 7–25)
CALCIUM SPEC-SCNC: 8.7 MG/DL (ref 8.6–10.5)
CHLORIDE SERPL-SCNC: 108 MMOL/L (ref 98–107)
CLARITY UR: ABNORMAL
CO2 SERPL-SCNC: 22.3 MMOL/L (ref 22–29)
COLOR UR: ABNORMAL
CREAT SERPL-MCNC: 0.61 MG/DL (ref 0.57–1)
CROSSMATCH INTERPRETATION: NORMAL
DEPRECATED RDW RBC AUTO: 48.6 FL (ref 37–54)
EGFRCR SERPLBLD CKD-EPI 2021: 124.3 ML/MIN/1.73
EOSINOPHIL # BLD AUTO: 0.04 10*3/MM3 (ref 0–0.4)
EOSINOPHIL NFR BLD AUTO: 0.4 % (ref 0.3–6.2)
ERYTHROCYTE [DISTWIDTH] IN BLOOD BY AUTOMATED COUNT: 19.4 % (ref 12.3–15.4)
GLUCOSE SERPL-MCNC: 124 MG/DL (ref 65–99)
GLUCOSE UR STRIP-MCNC: NEGATIVE MG/DL
HCT VFR BLD AUTO: 31.2 % (ref 34–46.6)
HCT VFR BLD AUTO: 31.4 % (ref 34–46.6)
HGB BLD-MCNC: 8.9 G/DL (ref 12–15.9)
HGB BLD-MCNC: 9 G/DL (ref 12–15.9)
HGB UR QL STRIP.AUTO: ABNORMAL
HYALINE CASTS UR QL AUTO: ABNORMAL /LPF
IMM GRANULOCYTES # BLD AUTO: 0.03 10*3/MM3 (ref 0–0.05)
IMM GRANULOCYTES NFR BLD AUTO: 0.3 % (ref 0–0.5)
KETONES UR QL STRIP: NEGATIVE
LEUKOCYTE ESTERASE UR QL STRIP.AUTO: ABNORMAL
LYMPHOCYTES # BLD AUTO: 2.69 10*3/MM3 (ref 0.7–3.1)
LYMPHOCYTES NFR BLD AUTO: 28.6 % (ref 19.6–45.3)
MAGNESIUM SERPL-MCNC: 2.4 MG/DL (ref 1.6–2.6)
MCH RBC QN AUTO: 20.5 PG (ref 26.6–33)
MCHC RBC AUTO-ENTMCNC: 28.8 G/DL (ref 31.5–35.7)
MCV RBC AUTO: 71.1 FL (ref 79–97)
MONOCYTES # BLD AUTO: 0.76 10*3/MM3 (ref 0.1–0.9)
MONOCYTES NFR BLD AUTO: 8.1 % (ref 5–12)
NEUTROPHILS NFR BLD AUTO: 5.83 10*3/MM3 (ref 1.7–7)
NEUTROPHILS NFR BLD AUTO: 62.2 % (ref 42.7–76)
NITRITE UR QL STRIP: NEGATIVE
NRBC BLD AUTO-RTO: 0 /100 WBC (ref 0–0.2)
PH UR STRIP.AUTO: 6 [PH] (ref 5–8)
PHOSPHATE SERPL-MCNC: 3.6 MG/DL (ref 2.5–4.5)
PLATELET # BLD AUTO: 303 10*3/MM3 (ref 140–450)
PMV BLD AUTO: 10.1 FL (ref 6–12)
POTASSIUM SERPL-SCNC: 3.9 MMOL/L (ref 3.5–5.2)
PROLACTIN SERPL-MCNC: 16.9 NG/ML (ref 4.79–23.3)
PROT UR QL STRIP: ABNORMAL
QT INTERVAL: 285 MS
QTC INTERVAL: 438 MS
RBC # BLD AUTO: 4.39 10*6/MM3 (ref 3.77–5.28)
RBC # UR STRIP: ABNORMAL /HPF
REF LAB TEST METHOD: ABNORMAL
SODIUM SERPL-SCNC: 140 MMOL/L (ref 136–145)
SP GR UR STRIP: 1.02 (ref 1–1.03)
SQUAMOUS #/AREA URNS HPF: ABNORMAL /HPF
UNIT  ABO: NORMAL
UNIT  RH: NORMAL
UROBILINOGEN UR QL STRIP: ABNORMAL
WBC # UR STRIP: ABNORMAL /HPF
WBC NRBC COR # BLD AUTO: 9.39 10*3/MM3 (ref 3.4–10.8)

## 2025-05-19 PROCEDURE — 81001 URINALYSIS AUTO W/SCOPE: CPT

## 2025-05-19 PROCEDURE — 96361 HYDRATE IV INFUSION ADD-ON: CPT

## 2025-05-19 PROCEDURE — G0378 HOSPITAL OBSERVATION PER HR: HCPCS

## 2025-05-19 PROCEDURE — 85014 HEMATOCRIT: CPT | Performed by: FAMILY MEDICINE

## 2025-05-19 PROCEDURE — 85025 COMPLETE CBC W/AUTO DIFF WBC: CPT

## 2025-05-19 PROCEDURE — 84100 ASSAY OF PHOSPHORUS: CPT

## 2025-05-19 PROCEDURE — 80048 BASIC METABOLIC PNL TOTAL CA: CPT

## 2025-05-19 PROCEDURE — 25810000003 SODIUM CHLORIDE 0.9 % SOLUTION

## 2025-05-19 PROCEDURE — 85018 HEMOGLOBIN: CPT | Performed by: FAMILY MEDICINE

## 2025-05-19 PROCEDURE — 83735 ASSAY OF MAGNESIUM: CPT

## 2025-05-19 RX ORDER — MEDROXYPROGESTERONE ACETATE 10 MG
10 TABLET ORAL EVERY 8 HOURS SCHEDULED
Qty: 30 TABLET | Refills: 0 | Status: SHIPPED | OUTPATIENT
Start: 2025-05-19

## 2025-05-19 RX ORDER — FERROUS SULFATE 325(65) MG
325 TABLET ORAL 2 TIMES DAILY WITH MEALS
Qty: 60 TABLET | Refills: 1 | Status: SHIPPED | OUTPATIENT
Start: 2025-05-19

## 2025-05-19 RX ADMIN — SODIUM CHLORIDE 100 ML/HR: 9 INJECTION, SOLUTION INTRAVENOUS at 05:11

## 2025-05-19 RX ADMIN — MEDROXYPROGESTERONE ACETATE 10 MG: 10 TABLET ORAL at 14:12

## 2025-05-19 RX ADMIN — MEDROXYPROGESTERONE ACETATE 10 MG: 10 TABLET ORAL at 05:09

## 2025-05-19 RX ADMIN — Medication 10 ML: at 08:37

## 2025-05-19 NOTE — CASE MANAGEMENT/SOCIAL WORK
Discharge Planning Assessment   Roberto     Patient Name: Ana Infante  MRN: [id number]  Today's Date: 5/19/2025    Admit Date: 5/18/2025    Plan: Routine home with spouse.   Discharge Needs Assessment       Row Name 05/19/25 1453       Living Environment    People in Home spouse    Name(s) of People in Home Kris    Current Living Arrangements home    Potentially Unsafe Housing Conditions none    In the past 12 months has the electric, gas, oil, or water company threatened to shut off services in your home? No    Primary Care Provided by self    Provides Primary Care For no one    Family Caregiver if Needed spouse    Family Caregiver Names Kris    Quality of Family Relationships helpful;involved;supportive    Able to Return to Prior Arrangements yes       Resource/Environmental Concerns    Resource/Environmental Concerns none    Transportation Concerns none       Transportation Needs    In the past 12 months, has lack of transportation kept you from medical appointments or from getting medications? no    In the past 12 months, has lack of transportation kept you from meetings, work, or from getting things needed for daily living? No       Transition Planning    Patient/Family Anticipates Transition to home with family    Patient/Family Anticipated Services at Transition none    Transportation Anticipated family or friend will provide       Discharge Needs Assessment    Readmission Within the Last 30 Days no previous admission in last 30 days    Equipment Currently Used at Home none    Concerns to be Addressed denies needs/concerns at this time    Do you want help finding or keeping work or a job? Patient declined    Do you want help with school or training? For example, starting or completing job training or getting a high school diploma, GED or equivalent Patient declined    Anticipated Changes Related to Illness none    Equipment Needed After Discharge none                   Discharge Plan       Row Name  05/19/25 1453       Plan    Plan Routine home with spouse.    Patient/Family in Agreement with Plan yes    Plan Comments CM met with patient at bedside. Confirmed PCP, insurance, and pharmacy.  Meds to beds enrolled. Patient denies any difficulty affording medications. Patient not current with any therapies. Confirmed transportation at discharge will be spouse. Discharging today after gynecology clearance.                Demographic Summary       Row Name 05/19/25 1452       General Information    Admission Type inpatient    Arrived From emergency department    Referral Source admission list    Reason for Consult discharge planning    Preferred Language English       Contact Information    Permission Granted to Share Info With                    Functional Status       Row Name 05/19/25 1452       Functional Status    Usual Activity Tolerance good    Current Activity Tolerance good       Functional Status, IADL    Medications independent    Meal Preparation independent    Housekeeping independent    Laundry independent    Shopping independent    If for any reason you need help with day-to-day activities such as bathing, preparing meals, shopping, managing finances, etc., do you get the help you need? I get all the help I need             Keisha Messina RN     Office: [phone]

## 2025-05-19 NOTE — DISCHARGE INSTR - DIET
Regular   
No apparent complications or complaints reguarding anesthesia care at this time/All questions were answered

## 2025-05-19 NOTE — PLAN OF CARE
Problem: Adult Inpatient Plan of Care  Goal: Plan of Care Review  5/18/2025 2339 by Jess Landis RN  Outcome: Progressing  Flowsheets (Taken 5/18/2025 2339)  Plan of Care Reviewed With: patient  5/18/2025 2336 by Jess Landis RN  Outcome: Progressing  Goal: Patient-Specific Goal (Individualized)  5/18/2025 2339 by Jess Landis RN  Outcome: Progressing  5/18/2025 2336 by Jess Landis RN  Outcome: Progressing  Goal: Absence of Hospital-Acquired Illness or Injury  5/18/2025 2339 by Jess Landis RN  Outcome: Progressing  5/18/2025 2336 by Jess Landis RN  Outcome: Progressing  Intervention: Identify and Manage Fall Risk  Recent Flowsheet Documentation  Taken 5/18/2025 2200 by Jess Landis RN  Safety Promotion/Fall Prevention:   nonskid shoes/slippers when out of bed   safety round/check completed  Taken 5/18/2025 2005 by Jess Landis RN  Safety Promotion/Fall Prevention: safety round/check completed  Intervention: Prevent Skin Injury  Recent Flowsheet Documentation  Taken 5/18/2025 2005 by Jess Landis RN  Body Position: position changed independently  Intervention: Prevent and Manage VTE (Venous Thromboembolism) Risk  Recent Flowsheet Documentation  Taken 5/18/2025 2005 by Jess Landis RN  VTE Prevention/Management:   bilateral   SCDs (sequential compression devices) off   patient refused intervention  Intervention: Prevent Infection  Recent Flowsheet Documentation  Taken 5/18/2025 2200 by Jess Landis RN  Infection Prevention:   hand hygiene promoted   single patient room provided  Taken 5/18/2025 2005 by Jess Landis RN  Infection Prevention: single patient room provided  Goal: Optimal Comfort and Wellbeing  5/18/2025 2339 by Jess Landis RN  Outcome: Progressing  5/18/2025 2336 by Jess Landis RN  Outcome: Progressing  Intervention: Provide Person-Centered Care  Recent Flowsheet Documentation  Taken 5/18/2025 2005 by Jess Landis RN  Trust Relationship/Rapport:   care explained    questions answered   thoughts/feelings acknowledged  Goal: Readiness for Transition of Care  5/18/2025 2339 by Jess Landis RN  Outcome: Progressing  5/18/2025 2336 by Jess Landis RN  Outcome: Progressing  Intervention: Mutually Develop Transition Plan  Recent Flowsheet Documentation  Taken 5/18/2025 2014 by Jess Landis RN  Equipment Currently Used at Home: none  Transportation Anticipated: car, drives self  Patient/Family Anticipated Services at Transition: none  Patient/Family Anticipates Transition to: home     Problem: Fall Injury Risk  Goal: Absence of Fall and Fall-Related Injury  5/18/2025 2339 by Jess Landis RN  Outcome: Progressing  5/18/2025 2336 by Jess Landis RN  Outcome: Progressing  Intervention: Identify and Manage Contributors  Recent Flowsheet Documentation  Taken 5/18/2025 2200 by Jess Landis, RN  Medication Review/Management: medications reviewed  Taken 5/18/2025 2005 by Jess Landis RN  Medication Review/Management: medications reviewed  Intervention: Promote Injury-Free Environment  Recent Flowsheet Documentation  Taken 5/18/2025 2200 by Jess Landis, RN  Safety Promotion/Fall Prevention:   nonskid shoes/slippers when out of bed   safety round/check completed  Taken 5/18/2025 2005 by Jess Landis RN  Safety Promotion/Fall Prevention: safety round/check completed   Goal Outcome Evaluation:  Plan of Care Reviewed With: patient

## 2025-05-19 NOTE — CONSULTS
Referring Provider: CURLY Akbar  Reason for Consultation: menorrhagia and acute blood loss anemia    Patient Care Team:  Gerald Livingston MD as PCP - General (Internal Medicine)    Chief complaint vaginal bleeding, cramping, and passage of blood clots    Subjective .     History of present illness:  25 y/o G0 presents to ED for worsening vaginal bleeding. She reports long history of abnormal uterine bleeding and history of amenorrhea, resulting in diagnosis of PCOS. She reports going 3-4 months without a menstrual cycle, followed by prolonged menstrual cycle. She reports this is the longest menstrual cycle, reports having menses for at least 30 days, but over past 24 hours she has had passage of clots and going through at least 10 pads. While in the ED she received I UPRBC and 1 g TXA. She reports her menstrual cycles have reduced since having medications.   She declined pelvic exam in ED and at bedside, patient is in bed and reports she prefers not to have pelvic exam at this time.   She does not have established care with OBGYN, but has followed with [facility] and diagnosed with PCOS at that time. She was initiated on COCPS but stopped due to cramping associated with taking OCPS.     Objective     Vital Signs   Vitals:    25 1724 25 1736 25   BP: 122/79 135/90 134/83 134/83   BP Location:   Left arm Left arm   Patient Position:   Sitting Lying   Pulse: 116 (!) 124 112 107   Resp: 18 16 16 23   Temp: 98.6 °F (37 °C) 99.2 °F (37.3 °C) 98.1 °F (36.7 °C) 98.1 °F (36.7 °C)   TempSrc:  Oral Oral Oral   SpO2: 99% 99% 97% 100%   Weight:       Height:         Temp (24hrs), Av.4 °F (36.9 °C), Min:98.1 °F (36.7 °C), Max:99.2 °F (37.3 °C)      Physical Exam:     General Appearance:    Alert, cooperative, in no acute distress   Abdomen:     Obese, no masses, no organomegaly, soft        non-tender, non-distended, no guarding, no rebound                 tenderness  "  Genitalia:    Declined at bedside, no saturation of pad     Lab Results:  Lab Results (last 48 hours)       Procedure Component Value Units Date/Time    Lactic Acid, Plasma [[id number]]  (Normal) Collected: 05/18/25 1921    Specimen: Blood Updated: 05/18/25 1944     Lactate 1.3 mmol/L     Prolactin [[id number]] Collected: 05/18/25 1921    Specimen: Blood Updated: 05/18/25 1923    TSH Rfx On Abnormal To Free T4 [[id number]]  (Normal) Collected: 05/18/25 1513    Specimen: Blood Updated: 05/18/25 1818     TSH 2.480 uIU/mL     Hemoglobin A1c [[id number]]  (Normal) Collected: 05/18/25 1513    Specimen: Blood Updated: 05/18/25 1758     Hemoglobin A1C 5.53 %     Narrative:      Hemoglobin A1C Ranges:    Increased Risk for Diabetes  5.7% to 6.4%  Diabetes                     >= 6.5%  Diabetic Goal                < 7.0%    C-reactive Protein [[id number]]  (Abnormal) Collected: 05/18/25 1513    Specimen: Blood Updated: 05/18/25 1758     C-Reactive Protein 3.97 mg/dL     Procalcitonin [[id number]]  (Normal) Collected: 05/18/25 1513    Specimen: Blood Updated: 05/18/25 1758     Procalcitonin 0.06 ng/mL     Narrative:      As a Marker for Sepsis (Non-Neonates):    1. <0.5 ng/mL represents a low risk of severe sepsis and/or septic shock.  2. >2 ng/mL represents a high risk of severe sepsis and/or septic shock.    As a Marker for Lower Respiratory Tract Infections that require antibiotic therapy:    PCT on Admission    Antibiotic Therapy       6-12 Hrs later    >0.5                Strongly Recommended  >0.25 - <0.5        Recommended   0.1 - 0.25          Discouraged              Remeasure/reassess PCT  <0.1                Strongly Discouraged     Remeasure/reassess PCT    As 28 day mortality risk marker: \"Change in Procalcitonin Result\" (>80% or <=80%) if Day 0 (or Day 1) and Day 4 values are available. Refer to http://www.[employer]-pct-calculator.com    Change in PCT <=80%  A decrease of PCT levels below or equal to 80% defines a " positive change in PCT test result representing a higher risk for 28-day all-cause mortality of patients diagnosed with severe sepsis for septic shock.    Change in PCT >80%  A decrease of PCT levels of more than 80% defines a negative change in PCT result representing a lower risk for 28-day all-cause mortality of patients diagnosed with severe sepsis or septic shock.       Ferritin [[id number]]  (Normal) Collected: 05/18/25 1513    Specimen: Blood Updated: 05/18/25 1643     Ferritin 16.80 ng/mL     Narrative:      Results may be falsely decreased if patient taking Biotin.      Iron Profile [[id number]]  (Abnormal) Collected: 05/18/25 1513    Specimen: Blood Updated: 05/18/25 1611     Iron 17 mcg/dL      Iron Saturation (TSAT) 4 %      Transferrin 300 mg/dL      TIBC 447 mcg/dL     Manual Differential [[id number]] Collected: 05/18/25 1513    Specimen: Blood Updated: 05/18/25 1541     Neutrophil % 73.0 %      Lymphocyte % 21.0 %      Monocyte % 5.0 %      Eosinophil % 1.0 %      Neutrophils Absolute 4.98 10*3/mm3      Lymphocytes Absolute 1.43 10*3/mm3      Monocytes Absolute 0.34 10*3/mm3      Eosinophils Absolute 0.07 10*3/mm3      RBC Morphology --     Comment: Corrected result. Previous result was Normal on 5/18/2025 at 1537 EDT.        Microcytes Slight/1+     Comment: Appended report. These results have been appended to a previously final verified report.        Toxic Granulation Slight/1+     Vacuolated Neutrophils Slight/1+     Large Platelets Mod/2+    Comprehensive Metabolic Panel [[id number]]  (Abnormal) Collected: 05/18/25 1513    Specimen: Blood Updated: 05/18/25 1539     Glucose 178 mg/dL      BUN 10 mg/dL      Creatinine 0.77 mg/dL      Sodium 139 mmol/L      Potassium 4.1 mmol/L      Chloride 105 mmol/L      CO2 22.1 mmol/L      Calcium 9.5 mg/dL      Total Protein 7.6 g/dL      Albumin 4.1 g/dL      ALT (SGPT) 13 U/L      AST (SGOT) 20 U/L      Alkaline Phosphatase 115 U/L      Total Bilirubin 0.2 mg/dL       Globulin 3.5 gm/dL      A/G Ratio 1.2 g/dL      BUN/Creatinine Ratio 13.0     Anion Gap 11.9 mmol/L      eGFR 107.2 mL/min/1.73     Narrative:      GFR Categories in Chronic Kidney Disease (CKD)              GFR Category          GFR (mL/min/1.73)    Interpretation  G1                    90 or greater        Normal or high (1)  G2                    60-89                Mild decrease (1)  G3a                   45-59                Mild to moderate decrease  G3b                   30-44                Moderate to severe decrease  G4                    15-29                Severe decrease  G5                    14 or less           Kidney failure    (1)In the absence of evidence of kidney disease, neither GFR category G1 or G2 fulfill the criteria for CKD.    eGFR calculation 2021 CKD-EPI creatinine equation, which does not include race as a factor    CBC & Differential [[id number]]  (Abnormal) Collected: 05/18/25 1513    Specimen: Blood Updated: 05/18/25 1537    Narrative:      The following orders were created for panel order CBC & Differential.  Procedure                               Abnormality         Status                     ---------                               -----------         ------                     CBC Auto Differential[[id number]]        Abnormal            Final result               Scan Slide[[id number]]                                       Final result                 Please view results for these tests on the individual orders.    CBC Auto Differential [[id number]]  (Abnormal) Collected: 05/18/25 1513    Specimen: Blood Updated: 05/18/25 1537     WBC 6.82 10*3/mm3      RBC 4.22 10*6/mm3      Hemoglobin 8.0 g/dL      Hematocrit 29.1 %      MCV 69.0 fL      MCH 19.0 pg      MCHC 27.5 g/dL      RDW 17.7 %      RDW-SD 43.1 fl      MPV 10.1 fL      Platelets 340 10*3/mm3     Scan Slide [[id number]] Collected: 05/18/25 1513    Specimen: Blood Updated: 05/18/25 1537     Scan Slide --     Comment:  See Manual Differential Results       hCG, Serum, Qualitative [[id number]]  (Normal) Collected: 05/18/25 1513    Specimen: Blood Updated: 05/18/25 1533     HCG Qualitative Negative    Protime-INR [[id number]]  (Abnormal) Collected: 05/18/25 1513    Specimen: Blood Updated: 05/18/25 1531     Protime 14.6 Seconds      INR 1.14    aPTT [[id number]]  (Normal) Collected: 05/18/25 1513    Specimen: Blood Updated: 05/18/25 1531     PTT 28.0 seconds             Radiology Results:  Imaging Results (Last 72 Hours)       Procedure Component Value Units Date/Time    US Pelvis Transvaginal Non OB [[id number]] Collected: 05/18/25 2119     Updated: 05/18/25 2122    Narrative:      US PELVIC AND TRANSVAGINAL NONOBSTETRICAL    Date of Exam: 5/18/2025 7:52 PM EDT    Indication: abnormal vaginal bleeding.    Comparison: No comparisons available.    Technique: Transabdominal and transvaginal ultrasound evaluation of the pelvis was performed utilizing grayscale and color Doppler technique.  Doppler spectral analysis was performed.      Findings:  The uterus measures 10.3 x 5.8 x 5.1 cm. The uterus is mildly heterogeneous without focal parenchymal abnormality. The endometrial stripe measures 12-13 mm.    The right ovary measures 3.1 x 2.6 x 1.8 cm and the left ovary measures 3.2 x 1.7 x 3.3 cm. Ovarian vascularity appears intact.  There is no evidence of a solid ovarian mass.    There is no significant free fluid identified within the pelvis.      Impression:      Impression:  Pelvic ultrasound appears within normal limits.            Electronically Signed: Bentley Buckley MD    5/18/2025 9:20 PM EDT    Workstation ID: [id number]            Assessment & Plan       Vaginal bleeding, abnormal      27 y/o GO presents with known diagnosis of PCOS and menorrhagia with acute blood loss anemia.     Menorrhagia with acute blood loss anemia:      Due to prolonged amenorrhea, now having persistent bleeding  Not currently on hormonal therapy, pt stopped  COCPs  Patient has no CI to estrogen based therapy based on history  Significant counseling done on PCOS and associated symptoms, including risk of endometrial hyperplasia  Counseling on hysteroscopy, dilation, and curettage if bleeding does not persist, and due to history of endometrial hyperplasia will recommend outpatient EMBX if not completed in clinic  US reviewed and counseled on findings of heterogenous myometrium, no masses noted   Patient unsure of last time she has had cervical pap smear so will need FU outpatient for AC visit and pap smear.   Patient should be scheduled to see me in our office within 1-2 weeks of discharge from hospital.   Due to menorrhagia: patient received 1 U PRBC, Hb improved to 8.8  She also received 1 g TXA   Initiated provera 10mg every 8 hours for menorrhagia   Plan for provera overnight, routine CBC monitoring, NPO at midnight in case surgery indicated in AM  Patient counseled on plan and agrees     I discussed the patients findings and my recommendations with patient    Rae Donnelly MD  05/18/25  21:53 EDT

## 2025-05-19 NOTE — NURSING NOTE
Pt reports bleeding has improved significantly. States it is still there but not nearly as heavy as it was.

## 2025-05-19 NOTE — CASE MANAGEMENT/SOCIAL WORK
Case Management Discharge Note      Final Note: Routine home         Selected Continued Care - Discharged on 5/19/2025 Admission date: 5/18/2025 - Discharge disposition: Home or Self Care         Transportation Services  Private: Car    Final Discharge Disposition Code: 01 - home or self-care

## 2025-05-19 NOTE — DISCHARGE SUMMARY
Date of Discharge:  5/19/2025    Discharge Diagnosis: abnormal uterine bleeding    Presenting Problem/History of Present Illness:  Active Hospital Problems   No active problems to display.      Resolved Hospital Problems    Diagnosis Date Resolved POA    **Vaginal bleeding, abnormal [N93.9] 05/19/2025 Yes          Hospital Course:  Patient is a 29 y.o. female presented with .G0 presents to ED for worsening vaginal bleeding. She reports long history of abnormal uterine bleeding and history of amenorrhea, resulting in diagnosis of PCOS. She reports going 3-4 months without a menstrual cycle, followed by prolonged menstrual cycle. She reports this is the longest menstrual cycle, reports having menses for at least 30 days, but over past 24 hours she has had passage of clots and going through at least 10 pads. While in the ED she received I UPRBC and 1 g TXA. She reports her menstrual cycles have reduced since having medications. Hasn't saturated or changed a pad in 3-4 hrs this morning. Mild cramping. Provera 10mg TID managing bleeding well. Mild cramps and pt has symptomatic anemia. Hgb stable 9.0. Surgery not indicated. Pt to f/u outpt /c OB/GYN in 1 week for ultrasound, endometrial biopsy, and pap.       Procedures Performed:         Consults:   Consults       Date and Time Order Name Status Description    5/18/2025  5:02 PM Hospitalist (on-call MD unless specified)      5/18/2025  4:39 PM OB/GYN (on-call MD unless specified)              Pertinent Test Results:   Lab Results (last 72 hours)       Procedure Component Value Units Date/Time    Prolactin [[id number]]  (Normal) Collected: 05/18/25 1921    Specimen: Blood Updated: 05/19/25 1041     Prolactin 16.90 ng/mL     Narrative:      Results may be falsely decreased if patient taking Biotin.     Hemoglobin & Hematocrit, Blood [[id number]]  (Abnormal) Collected: 05/19/25 0932    Specimen: Blood Updated: 05/19/25 0938     Hemoglobin 8.9 g/dL      Hematocrit 31.4 %      Urinalysis, Microscopic Only - Urine, Clean Catch [[id number]]  (Abnormal) Collected: 05/19/25 0017    Specimen: Urine, Clean Catch Updated: 05/19/25 0049     RBC, UA Too Numerous to Count /HPF      WBC, UA 3-5 /HPF      Comment: Urine culture not indicated.        Bacteria, UA None Seen /HPF      Squamous Epithelial Cells, UA 0-2 /HPF      Hyaline Casts, UA None Seen /LPF      Methodology Manual Light Microscopy    Basic Metabolic Panel [[id number]]  (Abnormal) Collected: 05/19/25 0003    Specimen: Blood Updated: 05/19/25 0044     Glucose 124 mg/dL      BUN 8 mg/dL      Creatinine 0.61 mg/dL      Sodium 140 mmol/L      Potassium 3.9 mmol/L      Chloride 108 mmol/L      CO2 22.3 mmol/L      Calcium 8.7 mg/dL      BUN/Creatinine Ratio 13.1     Anion Gap 9.7 mmol/L      eGFR 124.3 mL/min/1.73     Narrative:      GFR Categories in Chronic Kidney Disease (CKD)              GFR Category          GFR (mL/min/1.73)    Interpretation  G1                    90 or greater        Normal or high (1)  G2                    60-89                Mild decrease (1)  G3a                   45-59                Mild to moderate decrease  G3b                   30-44                Moderate to severe decrease  G4                    15-29                Severe decrease  G5                    14 or less           Kidney failure    (1)In the absence of evidence of kidney disease, neither GFR category G1 or G2 fulfill the criteria for CKD.    eGFR calculation 2021 CKD-EPI creatinine equation, which does not include race as a factor    Magnesium [[id number]]  (Normal) Collected: 05/19/25 0003    Specimen: Blood Updated: 05/19/25 0044     Magnesium 2.4 mg/dL     Phosphorus [[id number]]  (Normal) Collected: 05/19/25 0003    Specimen: Blood Updated: 05/19/25 0044     Phosphorus 3.6 mg/dL     Urinalysis With Culture If Indicated - Urine, Clean Catch [[id number]]  (Abnormal) Collected: 05/19/25 0017    Specimen: Urine, Clean Catch Updated: 05/19/25  0043     Color, UA Red     Comment: Any Substance that causes an abnormal urine color can alter the accuracy of the chemical reactions.        Appearance, UA Turbid     pH, UA 6.0     Specific Gravity, UA 1.025     Comment: Result obtained by Refractometer        Glucose, UA Negative     Comment: Due to the bloody appearance of the urine, macroscopic testing was performed on a centrifuged urine specimen to reduce RBC interference on the chemical testing.          Ketones, UA Negative     Bilirubin, UA Negative     Blood, UA Large (3+)     Protein, UA >=300 mg/dL (3+)     Leuk Esterase, UA --     Comment: The Leukoesterase test cannot be performed due to the centrifugation of the specimen.           Nitrite, UA Negative     Urobilinogen, UA 0.2 E.U./dL    Narrative:      In absence of clinical symptoms, the presence of pyuria, bacteria, and/or nitrites on the urinalysis result does not correlate with infection.    CBC & Differential [[id number]]  (Abnormal) Collected: 05/19/25 0003    Specimen: Blood Updated: 05/19/25 0029    Narrative:      The following orders were created for panel order CBC & Differential.  Procedure                               Abnormality         Status                     ---------                               -----------         ------                     CBC Auto Differential[[id number]]        Abnormal            Final result               Scan Slide[[id number]]                                                                    Please view results for these tests on the individual orders.    CBC Auto Differential [[id number]]  (Abnormal) Collected: 05/19/25 0003    Specimen: Blood Updated: 05/19/25 0029     WBC 9.39 10*3/mm3      RBC 4.39 10*6/mm3      Hemoglobin 9.0 g/dL      Hematocrit 31.2 %      MCV 71.1 fL      MCH 20.5 pg      MCHC 28.8 g/dL      RDW 19.4 %      RDW-SD 48.6 fl      MPV 10.1 fL      Platelets 303 10*3/mm3      Neutrophil % 62.2 %      Lymphocyte % 28.6 %      Monocyte %  8.1 %      Eosinophil % 0.4 %      Basophil % 0.4 %      Immature Grans % 0.3 %      Neutrophils, Absolute 5.83 10*3/mm3      Lymphocytes, Absolute 2.69 10*3/mm3      Monocytes, Absolute 0.76 10*3/mm3      Eosinophils, Absolute 0.04 10*3/mm3      Basophils, Absolute 0.04 10*3/mm3      Immature Grans, Absolute 0.03 10*3/mm3      nRBC 0.0 /100 WBC     Hemoglobin & Hematocrit, Blood [[id number]]  (Abnormal) Collected: 05/18/25 2206    Specimen: Blood Updated: 05/18/25 2214     Hemoglobin 8.8 g/dL      Hematocrit 30.7 %     Lactic Acid, Plasma [[id number]]  (Normal) Collected: 05/18/25 1921    Specimen: Blood Updated: 05/18/25 1944     Lactate 1.3 mmol/L     TSH Rfx On Abnormal To Free T4 [[id number]]  (Normal) Collected: 05/18/25 1513    Specimen: Blood Updated: 05/18/25 1818     TSH 2.480 uIU/mL     Hemoglobin A1c [[id number]]  (Normal) Collected: 05/18/25 1513    Specimen: Blood Updated: 05/18/25 1758     Hemoglobin A1C 5.53 %     Narrative:      Hemoglobin A1C Ranges:    Increased Risk for Diabetes  5.7% to 6.4%  Diabetes                     >= 6.5%  Diabetic Goal                < 7.0%    C-reactive Protein [[id number]]  (Abnormal) Collected: 05/18/25 1513    Specimen: Blood Updated: 05/18/25 1758     C-Reactive Protein 3.97 mg/dL     Procalcitonin [[id number]]  (Normal) Collected: 05/18/25 1513    Specimen: Blood Updated: 05/18/25 1758     Procalcitonin 0.06 ng/mL     Narrative:      As a Marker for Sepsis (Non-Neonates):    1. <0.5 ng/mL represents a low risk of severe sepsis and/or septic shock.  2. >2 ng/mL represents a high risk of severe sepsis and/or septic shock.    As a Marker for Lower Respiratory Tract Infections that require antibiotic therapy:    PCT on Admission    Antibiotic Therapy       6-12 Hrs later    >0.5                Strongly Recommended  >0.25 - <0.5        Recommended   0.1 - 0.25          Discouraged              Remeasure/reassess PCT  <0.1                Strongly Discouraged      "Remeasure/reassess PCT    As 28 day mortality risk marker: \"Change in Procalcitonin Result\" (>80% or <=80%) if Day 0 (or Day 1) and Day 4 values are available. Refer to http://[email or web address]-pct-[email or web address]    Change in PCT <=80%  A decrease of PCT levels below or equal to 80% defines a positive change in PCT test result representing a higher risk for 28-day all-cause mortality of patients diagnosed with severe sepsis for septic shock.    Change in PCT >80%  A decrease of PCT levels of more than 80% defines a negative change in PCT result representing a lower risk for 28-day all-cause mortality of patients diagnosed with severe sepsis or septic shock.       Ferritin [[id number]]  (Normal) Collected: 05/18/25 1513    Specimen: Blood Updated: 05/18/25 1643     Ferritin 16.80 ng/mL     Narrative:      Results may be falsely decreased if patient taking Biotin.      Iron Profile [[id number]]  (Abnormal) Collected: 05/18/25 1513    Specimen: Blood Updated: 05/18/25 1611     Iron 17 mcg/dL      Iron Saturation (TSAT) 4 %      Transferrin 300 mg/dL      TIBC 447 mcg/dL     Manual Differential [[id number]] Collected: 05/18/25 1513    Specimen: Blood Updated: 05/18/25 1541     Neutrophil % 73.0 %      Lymphocyte % 21.0 %      Monocyte % 5.0 %      Eosinophil % 1.0 %      Neutrophils Absolute 4.98 10*3/mm3      Lymphocytes Absolute 1.43 10*3/mm3      Monocytes Absolute 0.34 10*3/mm3      Eosinophils Absolute 0.07 10*3/mm3      RBC Morphology --     Comment: Corrected result. Previous result was Normal on 5/18/2025 at 1537 EDT.        Microcytes Slight/1+     Comment: Appended report. These results have been appended to a previously final verified report.        Toxic Granulation Slight/1+     Vacuolated Neutrophils Slight/1+     Large Platelets Mod/2+    Comprehensive Metabolic Panel [[id number]]  (Abnormal) Collected: 05/18/25 1513    Specimen: Blood Updated: 05/18/25 1539     Glucose 178 mg/dL      BUN 10 mg/dL      Creatinine " 0.77 mg/dL      Sodium 139 mmol/L      Potassium 4.1 mmol/L      Chloride 105 mmol/L      CO2 22.1 mmol/L      Calcium 9.5 mg/dL      Total Protein 7.6 g/dL      Albumin 4.1 g/dL      ALT (SGPT) 13 U/L      AST (SGOT) 20 U/L      Alkaline Phosphatase 115 U/L      Total Bilirubin 0.2 mg/dL      Globulin 3.5 gm/dL      A/G Ratio 1.2 g/dL      BUN/Creatinine Ratio 13.0     Anion Gap 11.9 mmol/L      eGFR 107.2 mL/min/1.73     Narrative:      GFR Categories in Chronic Kidney Disease (CKD)              GFR Category          GFR (mL/min/1.73)    Interpretation  G1                    90 or greater        Normal or high (1)  G2                    60-89                Mild decrease (1)  G3a                   45-59                Mild to moderate decrease  G3b                   30-44                Moderate to severe decrease  G4                    15-29                Severe decrease  G5                    14 or less           Kidney failure    (1)In the absence of evidence of kidney disease, neither GFR category G1 or G2 fulfill the criteria for CKD.    eGFR calculation 2021 CKD-EPI creatinine equation, which does not include race as a factor    CBC & Differential [[id number]]  (Abnormal) Collected: 05/18/25 1513    Specimen: Blood Updated: 05/18/25 1537    Narrative:      The following orders were created for panel order CBC & Differential.  Procedure                               Abnormality         Status                     ---------                               -----------         ------                     CBC Auto Differential[[id number]]        Abnormal            Final result               Scan Slide[[id number]]                                       Final result                 Please view results for these tests on the individual orders.    CBC Auto Differential [[id number]]  (Abnormal) Collected: 05/18/25 1513    Specimen: Blood Updated: 05/18/25 1537     WBC 6.82 10*3/mm3      RBC 4.22 10*6/mm3      Hemoglobin 8.0  g/dL      Hematocrit 29.1 %      MCV 69.0 fL      MCH 19.0 pg      MCHC 27.5 g/dL      RDW 17.7 %      RDW-SD 43.1 fl      MPV 10.1 fL      Platelets 340 10*3/mm3     Scan Slide [[id number]] Collected: 05/18/25 1513    Specimen: Blood Updated: 05/18/25 1537     Scan Slide --     Comment: See Manual Differential Results       hCG, Serum, Qualitative [[id number]]  (Normal) Collected: 05/18/25 1513    Specimen: Blood Updated: 05/18/25 1533     HCG Qualitative Negative    Protime-INR [[id number]]  (Abnormal) Collected: 05/18/25 1513    Specimen: Blood Updated: 05/18/25 1531     Protime 14.6 Seconds      INR 1.14    aPTT [[id number]]  (Normal) Collected: 05/18/25 1513    Specimen: Blood Updated: 05/18/25 1531     PTT 28.0 seconds           Imaging Results (Last 72 Hours)       Procedure Component Value Units Date/Time    US Pelvis Transvaginal Non OB [[id number]] Collected: 05/18/25 2119     Updated: 05/18/25 2122    Narrative:      US PELVIC AND TRANSVAGINAL NONOBSTETRICAL    Date of Exam: 5/18/2025 7:52 PM EDT    Indication: abnormal vaginal bleeding.    Comparison: No comparisons available.    Technique: Transabdominal and transvaginal ultrasound evaluation of the pelvis was performed utilizing grayscale and color Doppler technique.  Doppler spectral analysis was performed.      Findings:  The uterus measures 10.3 x 5.8 x 5.1 cm. The uterus is mildly heterogeneous without focal parenchymal abnormality. The endometrial stripe measures 12-13 mm.    The right ovary measures 3.1 x 2.6 x 1.8 cm and the left ovary measures 3.2 x 1.7 x 3.3 cm. Ovarian vascularity appears intact.  There is no evidence of a solid ovarian mass.    There is no significant free fluid identified within the pelvis.      Impression:      Impression:  Pelvic ultrasound appears within normal limits.            Electronically Signed: Bentley Buckley MD    5/18/2025 9:20 PM EDT    Workstation ID: [id number]            Condition on Discharge:  Good    Vital  Signs:  Vitals:    05/18/25 2358 05/19/25 0416 05/19/25 0818 05/19/25 1211   BP: 125/69 113/63 135/70 143/79   BP Location: Left arm Left arm Left arm Right arm   Patient Position: Lying Lying Sitting Lying   Pulse: 109 95 83 107   Resp: 11 15 19 18   Temp: 98 °F (36.7 °C) 97.8 °F (36.6 °C) 98 °F (36.7 °C) 97.8 °F (36.6 °C)   TempSrc: Oral Oral Oral Oral   SpO2: 99% 91% 99%    Weight:       Height:           Physical Exam:     General Appearance:    Alert, cooperative, in no acute distress   Lungs:     Clear to auscultation,respirations regular, even and                   unlabored    Heart:    Regular rhythm and normal rate.    Breast Exam:    Deferred   Abdomen:     Normal bowel sounds, no masses, no organomegaly, soft        non-tender, non-distended, no guarding, no rebound                 tenderness   Genitalia:    Deferred, small amount of bldg on regular pad   Extremities:   Moves all extremities well, no edema, no cyanosis, no             redness       Discharge Disposition:  Home    Discharge Medications:     Discharge Medications        New Medications        Instructions Start Date   ferrous sulfate 325 (65 FE) MG tablet   325 mg, Oral, 2 Times Daily With Meals      medroxyPROGESTERone 10 MG tablet  Commonly known as: PROVERA   10 mg, Oral, Every 8 Hours Scheduled             Stop These Medications      doxycycline 100 MG capsule  Commonly known as: MONODOX              Activity at Discharge:   Activity Instructions       Activity as Tolerated              Follow-up Appointments  No future appointments.  Additional Instructions for the Follow-ups that You Need to Schedule       Call MD With Problems / Concerns   As directed      Instructions: Temperature >100.4  Bleeding >1 pad per hour  Pain not controlled by pain medications.  Dizziness, syncope, palpitations, or SOA    Order Comments: Instructions: Temperature >100.4 Bleeding >1 pad per hour Pain not controlled by pain medications. Dizziness, syncope,  palpitations, or SOA         Discharge Follow-up with Specialty: OBGYN; 1 Week   As directed      Specialty: OBGYN   Follow Up: 1 Week   Follow Up Details: with Dr Donnelly for ultrasound, endometrial biopsy, and pap                Test Results Pending at Discharge  Pending Results       Procedure [Order ID] Specimen - Date/Time    Hemoglobin & Hematocrit, Blood [[id number]]     Specimen: Blood              KAMALA Crane  05/19/25  13:25 EDT

## 2025-05-19 NOTE — PLAN OF CARE
Problem: Adult Inpatient Plan of Care  Goal: Plan of Care Review  Outcome: Progressing  Goal: Patient-Specific Goal (Individualized)  Outcome: Progressing  Goal: Absence of Hospital-Acquired Illness or Injury  Outcome: Progressing  Intervention: Identify and Manage Fall Risk  Recent Flowsheet Documentation  Taken 5/18/2025 2200 by Jess Landis RN  Safety Promotion/Fall Prevention:   nonskid shoes/slippers when out of bed   safety round/check completed  Taken 5/18/2025 2005 by Jess Landis RN  Safety Promotion/Fall Prevention: safety round/check completed  Intervention: Prevent Skin Injury  Recent Flowsheet Documentation  Taken 5/18/2025 2005 by Jess Landis RN  Body Position: position changed independently  Intervention: Prevent and Manage VTE (Venous Thromboembolism) Risk  Recent Flowsheet Documentation  Taken 5/18/2025 2005 by Jess Landis RN  VTE Prevention/Management:   bilateral   SCDs (sequential compression devices) off   patient refused intervention  Intervention: Prevent Infection  Recent Flowsheet Documentation  Taken 5/18/2025 2200 by Jess Landis RN  Infection Prevention:   hand hygiene promoted   single patient room provided  Taken 5/18/2025 2005 by Jess Landis RN  Infection Prevention: single patient room provided  Goal: Optimal Comfort and Wellbeing  Outcome: Progressing  Intervention: Provide Person-Centered Care  Recent Flowsheet Documentation  Taken 5/18/2025 2005 by Jess Landis RN  Trust Relationship/Rapport:   care explained   questions answered   thoughts/feelings acknowledged  Goal: Readiness for Transition of Care  Outcome: Progressing  Intervention: Mutually Develop Transition Plan  Recent Flowsheet Documentation  Taken 5/18/2025 2014 by Jess Landis RN  Equipment Currently Used at Home: none  Transportation Anticipated: car, drives self  Patient/Family Anticipated Services at Transition: none  Patient/Family Anticipates Transition to: home     Problem: Fall Injury  Risk  Goal: Absence of Fall and Fall-Related Injury  Outcome: Progressing  Intervention: Identify and Manage Contributors  Recent Flowsheet Documentation  Taken 5/18/2025 2200 by Jess Landis, RN  Medication Review/Management: medications reviewed  Taken 5/18/2025 2005 by Jess Landis, RN  Medication Review/Management: medications reviewed  Intervention: Promote Injury-Free Environment  Recent Flowsheet Documentation  Taken 5/18/2025 2200 by Jess Landis, RN  Safety Promotion/Fall Prevention:   nonskid shoes/slippers when out of bed   safety round/check completed  Taken 5/18/2025 2005 by Jess Landis, RN  Safety Promotion/Fall Prevention: safety round/check completed   Goal Outcome Evaluation:

## 2025-05-19 NOTE — PLAN OF CARE
Problem: Adult Inpatient Plan of Care  Goal: Plan of Care Review  5/18/2025 2340 by Jess Landis RN  Outcome: Progressing  5/18/2025 2339 by Jess Landis RN  Outcome: Progressing  Flowsheets (Taken 5/18/2025 2339)  Plan of Care Reviewed With: patient  5/18/2025 2336 by Jess Landis RN  Outcome: Progressing  Goal: Patient-Specific Goal (Individualized)  5/18/2025 2340 by Jess Landis RN  Outcome: Progressing  5/18/2025 2339 by Jess Landis RN  Outcome: Progressing  5/18/2025 2336 by Jess Landis RN  Outcome: Progressing  Goal: Absence of Hospital-Acquired Illness or Injury  5/18/2025 2340 by Jess Landis RN  Outcome: Progressing  5/18/2025 2339 by Jess Landis RN  Outcome: Progressing  5/18/2025 2336 by Jess Landis RN  Outcome: Progressing  Intervention: Identify and Manage Fall Risk  Recent Flowsheet Documentation  Taken 5/18/2025 2200 by Jess Landis RN  Safety Promotion/Fall Prevention:   nonskid shoes/slippers when out of bed   safety round/check completed  Taken 5/18/2025 2005 by Jess Landis RN  Safety Promotion/Fall Prevention: safety round/check completed  Intervention: Prevent Skin Injury  Recent Flowsheet Documentation  Taken 5/18/2025 2005 by Jess Landis RN  Body Position: position changed independently  Intervention: Prevent and Manage VTE (Venous Thromboembolism) Risk  Recent Flowsheet Documentation  Taken 5/18/2025 2005 by Jess Landis RN  VTE Prevention/Management:   bilateral   SCDs (sequential compression devices) off   patient refused intervention  Intervention: Prevent Infection  Recent Flowsheet Documentation  Taken 5/18/2025 2200 by Jess Landis RN  Infection Prevention:   hand hygiene promoted   single patient room provided  Taken 5/18/2025 2005 by Jess Landis RN  Infection Prevention: single patient room provided  Goal: Optimal Comfort and Wellbeing  5/18/2025 2340 by Jess Landis RN  Outcome: Progressing  5/18/2025 2339 by Jess Landis RN  Outcome:  Progressing  5/18/2025 2336 by Jess Landis RN  Outcome: Progressing  Intervention: Provide Person-Centered Care  Recent Flowsheet Documentation  Taken 5/18/2025 2005 by Jess Landis RN  Trust Relationship/Rapport:   care explained   questions answered   thoughts/feelings acknowledged  Goal: Readiness for Transition of Care  5/18/2025 2340 by Jess Landis RN  Outcome: Progressing  5/18/2025 2339 by Jess Landis RN  Outcome: Progressing  5/18/2025 2336 by Jess Landis RN  Outcome: Progressing  Intervention: Mutually Develop Transition Plan  Recent Flowsheet Documentation  Taken 5/18/2025 2014 by Jess Landis RN  Equipment Currently Used at Home: none  Transportation Anticipated: car, drives self  Patient/Family Anticipated Services at Transition: none  Patient/Family Anticipates Transition to: home     Problem: Fall Injury Risk  Goal: Absence of Fall and Fall-Related Injury  5/18/2025 2340 by Jess Landis RN  Outcome: Progressing  5/18/2025 2339 by Jess Landis RN  Outcome: Progressing  5/18/2025 2336 by Jess Landis RN  Outcome: Progressing  Intervention: Identify and Manage Contributors  Recent Flowsheet Documentation  Taken 5/18/2025 2200 by Jess Landis RN  Medication Review/Management: medications reviewed  Taken 5/18/2025 2005 by Jess Landis RN  Medication Review/Management: medications reviewed  Intervention: Promote Injury-Free Environment  Recent Flowsheet Documentation  Taken 5/18/2025 2200 by Jess Landis RN  Safety Promotion/Fall Prevention:   nonskid shoes/slippers when out of bed   safety round/check completed  Taken 5/18/2025 2005 by Jess Landis RN  Safety Promotion/Fall Prevention: safety round/check completed     Problem: Anemia  Goal: Anemia Symptom Improvement  Outcome: Progressing  Intervention: Monitor and Manage Anemia  Recent Flowsheet Documentation  Taken 5/18/2025 2200 by Jess Landis RN  Safety Promotion/Fall Prevention:   nonskid shoes/slippers when out of  bed   safety round/check completed  Taken 5/18/2025 2005 by Jess Landis, RN  Safety Promotion/Fall Prevention: safety round/check completed     Problem: Infection  Goal: Absence of Infection Signs and Symptoms  Outcome: Progressing   Goal Outcome Evaluation:  Plan of Care Reviewed With: patient

## 2025-06-10 ENCOUNTER — LAB (OUTPATIENT)
Dept: LAB | Facility: HOSPITAL | Age: 30
End: 2025-06-10
Payer: COMMERCIAL

## 2025-06-10 ENCOUNTER — TRANSCRIBE ORDERS (OUTPATIENT)
Dept: ADMINISTRATIVE | Facility: HOSPITAL | Age: 30
End: 2025-06-10
Payer: COMMERCIAL

## 2025-06-10 DIAGNOSIS — E28.2 POLYCYSTIC OVARIES: ICD-10-CM

## 2025-06-10 DIAGNOSIS — D50.9 IRON DEFICIENCY ANEMIA, UNSPECIFIED IRON DEFICIENCY ANEMIA TYPE: ICD-10-CM

## 2025-06-10 DIAGNOSIS — N92.0 EXCESSIVE OR FREQUENT MENSTRUATION: ICD-10-CM

## 2025-06-10 DIAGNOSIS — D64.9 ANEMIA, UNSPECIFIED TYPE: ICD-10-CM

## 2025-06-10 DIAGNOSIS — D50.9 IRON DEFICIENCY ANEMIA, UNSPECIFIED IRON DEFICIENCY ANEMIA TYPE: Primary | ICD-10-CM

## 2025-06-10 LAB
CHOLEST SERPL-MCNC: 169 MG/DL (ref 0–200)
FERRITIN SERPL-MCNC: 92.1 NG/ML (ref 13–150)
HDLC SERPL-MCNC: 35 MG/DL (ref 40–60)
INSULIN SERPL-ACNC: 97 UU/ML (ref 2–23)
IRON 24H UR-MRATE: 25 MCG/DL (ref 37–145)
LDLC SERPL CALC-MCNC: 120 MG/DL (ref 0–100)
LDLC/HDLC SERPL: 3.41 {RATIO}
RETICS # AUTO: 0.09 10*6/MM3 (ref 0.02–0.13)
RETICS/RBC NFR AUTO: 1.97 % (ref 0.7–1.9)
TRIGL SERPL-MCNC: 74 MG/DL (ref 0–150)
VLDLC SERPL-MCNC: 14 MG/DL (ref 5–40)

## 2025-06-10 PROCEDURE — 85045 AUTOMATED RETICULOCYTE COUNT: CPT

## 2025-06-10 PROCEDURE — 82728 ASSAY OF FERRITIN: CPT

## 2025-06-10 PROCEDURE — 83540 ASSAY OF IRON: CPT

## 2025-06-10 PROCEDURE — 80061 LIPID PANEL: CPT

## 2025-06-10 PROCEDURE — 36415 COLL VENOUS BLD VENIPUNCTURE: CPT

## 2025-06-10 PROCEDURE — 83525 ASSAY OF INSULIN: CPT
